# Patient Record
Sex: FEMALE | Race: BLACK OR AFRICAN AMERICAN | Employment: UNEMPLOYED | URBAN - METROPOLITAN AREA
[De-identification: names, ages, dates, MRNs, and addresses within clinical notes are randomized per-mention and may not be internally consistent; named-entity substitution may affect disease eponyms.]

---

## 2019-10-08 ENCOUNTER — HOSPITAL ENCOUNTER (OUTPATIENT)
Dept: NON INVASIVE DIAGNOSTICS | Age: 54
Discharge: HOME | End: 2019-10-08
Attending: FAMILY MEDICINE

## 2019-10-08 DIAGNOSIS — I35.0 AORTIC STENOSIS: ICD-10-CM

## 2019-10-08 PROCEDURE — 93306 TTE W/DOPPLER COMPLETE: CPT

## 2021-03-17 ENCOUNTER — TRANSCRIBE ORDER (OUTPATIENT)
Dept: SCHEDULING | Age: 56
End: 2021-03-17

## 2021-03-17 DIAGNOSIS — I10 HYPERTENSION: Primary | ICD-10-CM

## 2021-03-24 ENCOUNTER — HOSPITAL ENCOUNTER (OUTPATIENT)
Dept: NON INVASIVE DIAGNOSTICS | Age: 56
Discharge: HOME | End: 2021-03-24
Attending: FAMILY MEDICINE
Payer: MEDICAID

## 2021-03-24 DIAGNOSIS — I10 HYPERTENSION: ICD-10-CM

## 2021-03-24 PROCEDURE — 93306 TTE W/DOPPLER COMPLETE: CPT

## 2021-04-05 ENCOUNTER — HOSPITAL ENCOUNTER (OUTPATIENT)
Dept: LAB | Age: 56
Discharge: HOME | End: 2021-04-05
Payer: COMMERCIAL

## 2021-04-05 PROCEDURE — 87624 HPV HI-RISK TYP POOLED RSLT: CPT

## 2021-04-05 PROCEDURE — 88142 CYTOPATH C/V THIN LAYER: CPT

## 2021-04-07 ENCOUNTER — HOSPITAL ENCOUNTER (OUTPATIENT)
Dept: LAB | Age: 56
Discharge: HOME | End: 2021-04-07

## 2021-04-07 PROCEDURE — 88305 TISSUE EXAM BY PATHOLOGIST: CPT

## 2021-04-10 LAB
Lab: NORMAL
REFERENCE LAB,REFLB: NORMAL
TEST DESCRIPTION:,ATST: NORMAL

## 2022-03-15 ENCOUNTER — HOSPITAL ENCOUNTER (OUTPATIENT)
Dept: REHABILITATION | Age: 57
Discharge: HOME | End: 2022-03-15
Payer: MEDICAID

## 2022-03-15 ENCOUNTER — HOSPITAL ENCOUNTER (OUTPATIENT)
Dept: SURGERY | Age: 57
Discharge: HOME | End: 2022-03-15
Payer: MEDICAID

## 2022-03-15 VITALS
HEIGHT: 63 IN | WEIGHT: 202 LBS | OXYGEN SATURATION: 98 % | DIASTOLIC BLOOD PRESSURE: 78 MMHG | SYSTOLIC BLOOD PRESSURE: 130 MMHG | TEMPERATURE: 97.7 F | BODY MASS INDEX: 35.79 KG/M2 | HEART RATE: 73 BPM

## 2022-03-15 LAB
ANION GAP SERPL CALC-SCNC: 4 MMOL/L (ref 7–16)
APTT PPP: 31.1 SEC (ref 24.1–35.1)
ATRIAL RATE: 70 BPM
BACTERIA SPEC CULT: NORMAL
BASOPHILS # BLD: 0 K/UL (ref 0–0.2)
BASOPHILS NFR BLD: 1 % (ref 0–2)
BUN SERPL-MCNC: 15 MG/DL (ref 6–23)
CALCIUM SERPL-MCNC: 9.1 MG/DL (ref 8.3–10.4)
CALCULATED P AXIS, ECG09: 58 DEGREES
CALCULATED R AXIS, ECG10: 2 DEGREES
CALCULATED T AXIS, ECG11: -19 DEGREES
CHLORIDE SERPL-SCNC: 109 MMOL/L (ref 98–107)
CO2 SERPL-SCNC: 29 MMOL/L (ref 21–32)
CREAT SERPL-MCNC: 1.36 MG/DL (ref 0.6–1)
DIAGNOSIS, 93000: NORMAL
DIFFERENTIAL METHOD BLD: ABNORMAL
EOSINOPHIL # BLD: 0.1 K/UL (ref 0–0.8)
EOSINOPHIL NFR BLD: 4 % (ref 0.5–7.8)
ERYTHROCYTE [DISTWIDTH] IN BLOOD BY AUTOMATED COUNT: 13.2 % (ref 11.9–14.6)
EST. AVERAGE GLUCOSE BLD GHB EST-MCNC: NORMAL MG/DL
GLUCOSE SERPL-MCNC: 102 MG/DL (ref 65–100)
HBA1C MFR BLD: 4.9 % (ref 4.2–6.3)
HCT VFR BLD AUTO: 38.6 % (ref 35.8–46.3)
HGB BLD-MCNC: 12.6 G/DL (ref 11.7–15.4)
IMM GRANULOCYTES # BLD AUTO: 0 K/UL (ref 0–0.5)
IMM GRANULOCYTES NFR BLD AUTO: 0 % (ref 0–5)
INR PPP: 0.9
LYMPHOCYTES # BLD: 1.5 K/UL (ref 0.5–4.6)
LYMPHOCYTES NFR BLD: 43 % (ref 13–44)
MCH RBC QN AUTO: 29.2 PG (ref 26.1–32.9)
MCHC RBC AUTO-ENTMCNC: 32.6 G/DL (ref 31.4–35)
MCV RBC AUTO: 89.4 FL (ref 79.6–97.8)
MONOCYTES # BLD: 0.3 K/UL (ref 0.1–1.3)
MONOCYTES NFR BLD: 8 % (ref 4–12)
NEUTS SEG # BLD: 1.5 K/UL (ref 1.7–8.2)
NEUTS SEG NFR BLD: 45 % (ref 43–78)
NRBC # BLD: 0 K/UL (ref 0–0.2)
P-R INTERVAL, ECG05: 204 MS
PLATELET # BLD AUTO: 294 K/UL (ref 150–450)
PMV BLD AUTO: 10.1 FL (ref 9.4–12.3)
POTASSIUM SERPL-SCNC: 3.6 MMOL/L (ref 3.5–5.1)
PROTHROMBIN TIME: 12.7 SEC (ref 12.6–14.5)
Q-T INTERVAL, ECG07: 386 MS
QRS DURATION, ECG06: 96 MS
QTC CALCULATION (BEZET), ECG08: 416 MS
RBC # BLD AUTO: 4.32 M/UL (ref 4.05–5.2)
SERVICE CMNT-IMP: NORMAL
SODIUM SERPL-SCNC: 142 MMOL/L (ref 136–145)
VENTRICULAR RATE, ECG03: 70 BPM
WBC # BLD AUTO: 3.4 K/UL (ref 4.3–11.1)

## 2022-03-15 PROCEDURE — 87641 MR-STAPH DNA AMP PROBE: CPT

## 2022-03-15 PROCEDURE — 80048 BASIC METABOLIC PNL TOTAL CA: CPT

## 2022-03-15 PROCEDURE — 85025 COMPLETE CBC W/AUTO DIFF WBC: CPT

## 2022-03-15 PROCEDURE — 93005 ELECTROCARDIOGRAM TRACING: CPT | Performed by: ANESTHESIOLOGY

## 2022-03-15 PROCEDURE — 77030027138 HC INCENT SPIROMETER -A

## 2022-03-15 PROCEDURE — 85730 THROMBOPLASTIN TIME PARTIAL: CPT

## 2022-03-15 PROCEDURE — 83036 HEMOGLOBIN GLYCOSYLATED A1C: CPT

## 2022-03-15 PROCEDURE — 94760 N-INVAS EAR/PLS OXIMETRY 1: CPT

## 2022-03-15 PROCEDURE — 97161 PT EVAL LOW COMPLEX 20 MIN: CPT

## 2022-03-15 PROCEDURE — 85610 PROTHROMBIN TIME: CPT

## 2022-03-15 RX ORDER — ACETAMINOPHEN 500 MG
1000 TABLET ORAL
COMMUNITY
End: 2022-04-05

## 2022-03-15 RX ORDER — ATORVASTATIN CALCIUM 10 MG/1
10 TABLET, FILM COATED ORAL DAILY
COMMUNITY

## 2022-03-15 RX ORDER — HYDROXYZINE 25 MG/1
25 TABLET, FILM COATED ORAL
COMMUNITY

## 2022-03-15 RX ORDER — HYDROCHLOROTHIAZIDE 25 MG/1
25 TABLET ORAL DAILY
COMMUNITY

## 2022-03-15 RX ORDER — BENAZEPRIL HYDROCHLORIDE 10 MG/1
10 TABLET ORAL DAILY
COMMUNITY

## 2022-03-15 NOTE — PROGRESS NOTES
03/15/22 1030   Oxygen Therapy   O2 Sat (%) 98 %   Pulse via Oximetry 80 beats per minute   O2 Device None (Room air)   Pre-Treatment   Breath Sounds Bilateral Clear   Pre FEV1 (liters) 2.6 liters   % Predicted 100     Initial respiratory Assessment completed with pt. Pt was interviewed and evaluated in Joint camp prior to surgery. Patient ID:  Katja Pain  [id number]  56 y.o.  1965  Surgeon: Dr. Vincent Mckeon  Date of Surgery: 4/5/2022  Procedure: Total Left Hip Arthroplasty  Primary Care Physician: Becky, Rocío, MD None  Specialists:     Pt taught proper COUGH technique  DIAPHRAGMATIC BREATHING EXERCISE INSTRUCTIONS GIVEN    History of smoking:   CURRENT SMOKER-    3-4 CIG   PER DAY  for     15       YEARS  Smoking Cessation  \"SMOKING: YOUR PLAN TO QUIT\" handout given to pt. Pt taught to identify when anxiety or stress  is a trigger . Relaxation breathing technique taught to pt. Quit date:         Secondhand smoke:PARENTS    Past procedures with Oxygen desaturation or delayed awakening:DENIES    Past Medical History:   Diagnosis Date    Anxiety     BMI 35.0-35.9,adult     Current smoker     <0.25 pack/day    High cholesterol     on med for control     History of Bell's palsy     Hypertension     on med for control     OA (osteoarthritis)     CURRENT SLIGHTLY CONGESTED NPC. Respiratory history:DENIES SOB                                                                  Respiratory meds:  DENIES    FAMILY PRESENT:             NO     PAST SLEEP STUDY:                   DENIES  HX OF KESHA:                                           DENIES  KESHA assessment:                                               SLEEPS ON SIDE       &      BACK           PHYSICAL EXAM   Body mass index is 35.78 kg/m².    Visit Vitals  /78 (BP 1 Location: Left upper arm, BP Patient Position: At rest;Sitting)   Pulse 73   Temp 97.7 °F (36.5 °C)   Ht 5' 3\" (1.6 m)   Wt 91.6 kg (202 lb)   SpO2 (P) 98%   BMI 35.78 kg/m²     Neck circumference:  36    cm    Loud snoring:                                                          DENIES  Witnessed apnea or wakening gasping or choking:        DENIES        Awakens with headaches:                                               DENIES  Morning or daytime tiredness/ sleepiness:                      DENIES         Dry mouth or sore throat in morning:                   DENIES                                   Torres stage:  4                                   SACS score:4  Stop Bang   STOP-BANG  Does the patient snore loudly (louder than talking or loud enough to be heard through closed doors)?: No  Does the patient often feel tired, fatigued, or sleepy during the daytime, even after a \"good\" night's sleep?: No  Has anyone ever observed the patient stop breathing during their sleep? : No  Does the patient have or are they being treated for high blood pressure?: Yes  Is the patient's BMI greater than 35?: Yes  Is your neck circumference greater than 17 inches (Male) or 16 inches (Female)?: No  Is the patient older than 48?: Yes  Is the patient male?: No  KESHA Score: 3  Has the patient been referred to Sleep Medicine?: No  Has the patient previously been diagnosed with Obstructive Sleep Apnea?: No                                   CS HS  RESPIRATORY ASSESSMENT Q SHIFT   O2 PRN    ALBUTEROL  NEBULIZER Q6 PRN WHEEZING                                         Referrals:    Pt. Phone Number:

## 2022-03-15 NOTE — PERIOP NOTES
Patient verified name and . Order for consent found in EHR and matches case posting; patient verified. Type 3 surgery, joint camp assessment complete. Labs per surgeon: CBC, BMP, PT/PTT, HGB-A1C ; results pending. Labs per anesthesia protocol: no additional labs needed. EKG: completed today; within anesthesia protocols. Carotid ultrasound (3/31/21) and Echo (3/24/21) available in EHR for reference. MRSA/MSSA swab collected; pharmacy to review and dose antibiotic as appropriate. Hospital approved surgical skin cleanser and instructions to return bottle on DOS given per hospital policy. Patient provided with handouts including Guide to Surgery, Pain Management, Hand Hygiene, Blood Transfusion Education, and [facility] Brochure. Patient answered medical/surgical history questions at their best of ability. All prior to admission medications documented in [facility]. Original medication prescription bottles NOT visualized during patient appointment. Patient instructed to hold all vitamins 3 weeks prior to surgery and NSAIDS 5 days prior to surgery. Patient teach back successful and patient demonstrates knowledge of instruction.

## 2022-03-15 NOTE — PERIOP NOTES
Dr. Rolf Villanueva,     Your patient recently had labs drawn during a hospital appointment due to an upcoming surgery. The results are attached. If you have any questions or concerns please reach out to your patient for a follow-up in your office. Please do not respond to this message as my mailbox is not monitored. You may call [phone] with questions or concerns. Recent Results (from the past 12 hour(s))   EKG, 12 LEAD, INITIAL    Collection Time: 03/15/22 10:00 AM   Result Value Ref Range    Ventricular Rate 70 BPM    Atrial Rate 70 BPM    P-R Interval 204 ms    QRS Duration 96 ms    Q-T Interval 386 ms    QTC Calculation (Bezet) 416 ms    Calculated P Axis 58 degrees    Calculated R Axis 2 degrees    Calculated T Axis -19 degrees    Diagnosis       Normal sinus rhythm  Incomplete right bundle branch block  Nonspecific T wave abnormality  Abnormal ECG  No previous ECGs available     CBC WITH AUTOMATED DIFF    Collection Time: 03/15/22 10:51 AM   Result Value Ref Range    WBC 3.4 (L) 4.3 - 11.1 K/uL    RBC 4.32 4.05 - 5.2 M/uL    HGB 12.6 11.7 - 15.4 g/dL    HCT 38.6 35.8 - 46.3 %    MCV 89.4 79.6 - 97.8 FL    MCH 29.2 26.1 - 32.9 PG    MCHC 32.6 31.4 - 35.0 g/dL    RDW 13.2 11.9 - 14.6 %    PLATELET 048 623 - 078 K/uL    MPV 10.1 9.4 - 12.3 FL    ABSOLUTE NRBC 0.00 0.0 - 0.2 K/uL    DF AUTOMATED      NEUTROPHILS 45 43 - 78 %    LYMPHOCYTES 43 13 - 44 %    MONOCYTES 8 4.0 - 12.0 %    EOSINOPHILS 4 0.5 - 7.8 %    BASOPHILS 1 0.0 - 2.0 %    IMMATURE GRANULOCYTES 0 0.0 - 5.0 %    ABS. NEUTROPHILS 1.5 (L) 1.7 - 8.2 K/UL    ABS. LYMPHOCYTES 1.5 0.5 - 4.6 K/UL    ABS. MONOCYTES 0.3 0.1 - 1.3 K/UL    ABS. EOSINOPHILS 0.1 0.0 - 0.8 K/UL    ABS. BASOPHILS 0.0 0.0 - 0.2 K/UL    ABS. IMM.  GRANS. 0.0 0.0 - 0.5 K/UL   HEMOGLOBIN A1C WITH EAG    Collection Time: 03/15/22 10:51 AM   Result Value Ref Range    Hemoglobin A1c 4.9 4.20 - 6.30 %    Est. average glucose Cannot be calculated mg/dL   METABOLIC PANEL, BASIC Collection Time: 03/15/22 10:51 AM   Result Value Ref Range    Sodium 142 136 - 145 mmol/L    Potassium 3.6 3.5 - 5.1 mmol/L    Chloride 109 (H) 98 - 107 mmol/L    CO2 29 21 - 32 mmol/L    Anion gap 4 (L) 7 - 16 mmol/L    Glucose 102 (H) 65 - 100 mg/dL    BUN 15 6 - 23 MG/DL    Creatinine 1.36 (H) 0.6 - 1.0 MG/DL    GFR est AA 52 (L) >60 ml/min/1.73m2    GFR est non-AA 43 (L) >60 ml/min/1.73m2    Calcium 9.1 8.3 - 10.4 MG/DL   PROTHROMBIN TIME + INR    Collection Time: 03/15/22 10:51 AM   Result Value Ref Range    Prothrombin time 12.7 12.6 - 14.5 sec    INR 0.9     PTT    Collection Time: 03/15/22 10:51 AM   Result Value Ref Range    aPTT 31.1 24.1 - 35.1 SEC

## 2022-03-15 NOTE — PERIOP NOTES
PLEASE CONTINUE TAKING ALL PRESCRIPTION MEDICATIONS UP TO THE DAY OF SURGERY UNLESS OTHERWISE DIRECTED BELOW. DISCONTINUE all vitamins, herbals, and supplements 21 days prior to surgery. DISCONTINUE Non-Steriodal Anti-Inflammatory (NSAIDS) such as Advil, Ibuprofen, Motrin, Aspirin, Naproxen, and Aleve 5-7 days prior to surgery. Home Medications to take  the day of surgery (4/5/22)      Atorvastatin, Hydroxyzine if needed, Tylenol if needed             Home Medications   to Hold           Comments   Bring: Dasha-hex soap, Incentive Spirometer, Photo ID, Insurance card, Benazepril    On the day before surgery (4/4/22)  please take Acetaminophen 1000mg in the morning and then again before bed. You may substitute for Tylenol 650 mg. Please do not bring home medications with you on the day of surgery unless otherwise directed by your nurse. If you are instructed to bring home medications, please give them to your nurse as they will be administered by the nursing staff. If you have any questions, please call [facility] [phone]. A copy of this note was provided to the patient for reference.

## 2022-03-15 NOTE — PROGRESS NOTES
Mitzi Hickman  : (89 y.o.) [address]  at [address] [place] [address], [address] [facility]  [phone]       Physical Therapy Prehab Plan of Treatment and Evaluation Summary:3/15/2022    ICD-10: Treatment Diagnosis:   · Pain in left hip (M25.552)  · Stiffness of Left Hip, Not elsewhere classified (I49.110)  Precautions/Allergies:   Patient has no known allergies. MEDICAL/REFERRING DIAGNOSIS:  Unilateral primary osteoarthritis, left hip [M16.12]  REFERRING PHYSICIAN: Rima Elena MD  DATE OF SURGERY: 22    Assessment:   Comments:  She is here with her friend and is having a L MALIK. She will go home at AK with her friend's support. She has RW for [place]. PROBLEM LIST (Impacting functional limitations):  Ms. Renteria Nicely presents with the following left lower extremity(s) problems:  1. Strength  2. Range of Motion  3. Home Exercise Program  4. Pain   INTERVENTIONS PLANNED:  1. Home Exercise Program  2. Educational Discussion      TREATMENT PLAN: Effective Dates: 3/15/2022 TO 3/15/2022. Frequency/Duration: Patient to continue to perform home exercise program at least twice per day up until her surgery. GOALS: (Goals have been discussed and agreed upon with patient.)  Discharge Goals: Time Frame: 1 Day  1. Patient will demonstrate independence with a home exercise program designed to increase strength, range of motion and pain control to minimize functional deficits and optimize patient for total joint replacement. Rehabilitation Potential For Stated Goals: Good  Regarding Astrid Curtis's therapy, I certify that the treatment plan above will be carried out by a therapist or under their direction.   Thank you for this referral,  Suyapa Alba, PT               HISTORY:   Present Symptoms:  Pain Intensity 1: 10 (at its worst)  Pain Location 1: Hip  Pain Orientation 1: Anterior,Left,Lateral   History of Present Injury/Illness (Reason for Referral):  Medical/Referring Diagnosis: Unilateral primary osteoarthritis, left hip [M16.12]   Past Medical History/Comorbidities:   Ms. Ange De Luna  has a past medical history of Anxiety, BMI 35.0-35.9,adult, Current smoker, High cholesterol, History of Bell's palsy, Hypertension, and OA (osteoarthritis). She has no past medical history of COPD, Endocrine disease, Gastrointestinal disorder, Infectious disease, Neurological disorder, Other ill-defined conditions(799.89), or Sleep disorder. Ms. Ange De Luna  has a past surgical history that includes hx tonsillectomy; hx appendectomy; and hx colonoscopy.   Social History/Living Environment:   Home Environment: Private residence  # Steps to Enter: 3  Rails to Enter: No  One/Two Story Residence: One story  Living Alone: Yes  Support Systems: Friend/Neighbor  Patient Expects to be Discharged to[de-identified] Home with home health  Current DME Used/Available at Home: Cane, straight; Walker, rolling  Tub or Shower Type: Shower    Work/Activity:  disabled  Dominant Side:  LEFT  Current Medications:  See Pre-assessment nursing note   Number of Personal Factors/Comorbidities that affect the Plan of Care: 1-2: MODERATE COMPLEXITY   EXAMINATION:   ADLs (Current Functional Status):   Ambulation:  [x] Independent  [] Walk Indoors Only  [] Walk Outdoors  [x] Use Assistive Device  [] Use Wheelchair Only Dressing:  [x] [address] from Someone for:  [] Sock/Shoes  [] Pants  [] Everything   Bathing/Showering:   [x] Independent  [] Requires Assistance from Someone  [] [address]:  [] Routine house and yard work  [x] Light Housework Only  [] None   Observation/Orthostatic Postural Assessment:       ROM/Flexibility:   AROM: Within functional limits (R LE)                LLE AROM  L Hip Flexion: 80  L Hip ABduction: 15          Strength:   Strength: Generally decreased, functional (R LE 4/5)       LLE Strength  L Hip Flexion: 2  L Hip ABduction: 2  L Knee Extension: 2+  L Ankle Dorsiflexion: 3          Functional Mobility:    Sensation: Intact (R LE)    Stand to Sit: Independent  Sit to Stand: Independent  Stand Pivot Transfers: Independent  Distance (ft): 300 Feet (ft)  Ambulation - Level of Assistance: Modified independent  Assistive Device: Cane, straight  Speed/Elsa: Pace decreased (<100 feet/min)  Step Length: Right shortened  Stance: Left decreased  Gait Abnormalities: Antalgic          Balance:    Sitting: Intact  Standing: With support   Body Structures Involved:  1. Bones  2. Joints  3. Muscles Body Functions Affected:  1. Movement Related Activities and Participation Affected:  1. Mobility   Number of elements that affect the Plan of Care: 4+: HIGH COMPLEXITY   CLINICAL PRESENTATION:   Presentation: Stable and uncomplicated: LOW COMPLEXITY   CLINICAL DECISION MAKING:   Tool Used: Hip dysfunction and Osteoarthritis Outcome Score for Joint Replacement (HOOS, JR)  Score:  Initial: 12 (Interval: 52.965) 3/15/2022 Most Recent:    Interpretation of Score: The HOOS, JR contains 6 items from the original HOOS survey. Items are coded from 0 to 4, none to extreme respectively. Durel Escort is scored by summing the raw response (range 0-24) and then converting it to an interval score using the table provided below. The interval score ranges from 0 to 100 where 0 represents total hip disability and 100 represents perfect hip health. Medical Necessity:   · Ms. Ellyn Murguia is expected to optimize her lower extremity strength and ROM in preparation for joint replacement surgery. Reason for Services/Other Comments:  · Achieve baseline assesment of musculoskeletal system, functional mobility and home environment. , educate in PT HEP in preparation for surgery, educate in hospital plan of care.    Use of outcome tool(s) and clinical judgement create a POC that gives a: Clear prediction of patient's progress: LOW COMPLEXITY   TREATMENT:   Treatment/Session Assessment: Patient was instructed in PT- HEP to increase strength and ROM in LEs. Answered all questions. · Post session pain:  3  · Compliance with Program/Exercises: compliant most of the time.   Total Treatment Duration:  PT Patient Time In/Time Out  Time In: 1100  Time Out: [address], PT

## 2022-03-15 NOTE — PERIOP NOTES
CBC, BMP, PT/PTT, HGB-A1C; results within anesthesia protocols. Staff message sent to [facility] at surgeons office informing them of low wbc count of 3.4 per anesthesia protocols.

## 2022-03-16 NOTE — ADVANCED PRACTICE NURSE
Total Joint Surgery Preoperative Chart Review      Patient ID:  Nia Alvarez  [id number]  56 y.o.  1965  Surgeon: Dr. Divya Boland  Date of Surgery: 4/5/2022  Procedure: Total Left Hip Arthroplasty  Primary Care Physician: Becky, MD Rocío None  Specialty Physician(s):      Subjective:   Nia Alvarez is a 56 y.o. BLACK/ female who presents for preoperative evaluation for Total Left Hip arthroplasty. This is a preoperative chart review note based on data collected by the nurse at the surgical Pre-Assessment visit. Past Medical History:   Diagnosis Date    Anxiety     BMI 35.0-35.9,adult     Current smoker     <0.25 pack/day    High cholesterol     on med for control     History of Bell's palsy     Hypertension     on med for control     OA (osteoarthritis)       Past Surgical History:   Procedure Laterality Date    HX APPENDECTOMY      HX COLONOSCOPY      HX TONSILLECTOMY       History reviewed. No pertinent family history. Social History     Tobacco Use    Smoking status: Current Every Day Smoker     Packs/day: 0.25     Years: 15.00     Pack years: 3.75    Smokeless tobacco: Never Used   Substance Use Topics    Alcohol use: Not Currently       Prior to Admission medications    Medication Sig Start Date End Date Taking? Authorizing Provider   atorvastatin (LIPITOR) 10 mg tablet Take 10 mg by mouth daily. Yes Provider, Historical   hydrOXYzine HCL (ATARAX) 25 mg tablet Take 25 mg by mouth every six (6) hours as needed for Anxiety. Yes Provider, Historical   acetaminophen (Tylenol Extra Strength) 500 mg tablet Take 1,000 mg by mouth every six (6) hours as needed for Pain. Yes Provider, Historical   benazepriL (LOTENSIN) 10 mg tablet Take 10 mg by mouth daily. Indications: high blood pressure   Yes Provider, Historical   hydroCHLOROthiazide (HYDRODIURIL) 25 mg tablet Take 25 mg by mouth daily.  Indications: high blood pressure   Yes Provider, Historical     No Known Allergies       Objective:     Physical Exam:   No data found. ECG:    EKG Results     Procedure 720 Value Units Date/Time    EKG, 12 LEAD, INITIAL [[id number]] Collected: 03/15/22 1000    Order Status: Completed Updated: 03/15/22 1422     Ventricular Rate 70 BPM      Atrial Rate 70 BPM      P-R Interval 204 ms      QRS Duration 96 ms      Q-T Interval 386 ms      QTC Calculation (Bezet) 416 ms      Calculated P Axis 58 degrees      Calculated R Axis 2 degrees      Calculated T Axis -19 degrees      Diagnosis --     Normal sinus rhythm  Incomplete right bundle branch block  Nonspecific T wave abnormality  Abnormal ECG  No previous ECGs available  Confirmed by Kevin Hicks ([id number]) on 3/15/2022 2:22:30 PM            Data Review:   Labs:   Labs reviewed    Problem List:  )There is no problem list on file for this patient. Total Joint Surgery Pre-Assessment Recommendations:           Smoker  Albuterol every 6 hours as need during hospitalization. Continuous saturation monitoring during hospitalization. O2 prn per respiratory protocol.      Signed By: Frankey Lei, NP-C    March 16, 2022

## 2022-04-01 NOTE — H&P
H&P    Patient ID:  Keith Rogers  [id number]  85 y.o.  1965  Surgeon:  Jhony Murray MD  Date of Surgery: * No surgery date entered *  Procedure: Left Total Hip Arthroplasty  Primary Care Physician: Rocío Pena MD        Subjective:  Keith Rogers is a 56 y.o. BLACK/ female who presents with left hip pain. They have a history of left hip pain for several months. Symptoms worse with walking long distances and relieved with rest. Conservative treatment consisting of  activity modification has not helped. The patient lives with their family. The patients goal after surgery is improved pain and function. Past Medical History:   Diagnosis Date    Anxiety     BMI 35.0-35.9,adult     Current smoker     <0.25 pack/day    High cholesterol     on med for control     History of Bell's palsy     Hypertension     on med for control     OA (osteoarthritis)       Past Surgical History:   Procedure Laterality Date    HX APPENDECTOMY      HX COLONOSCOPY      HX TONSILLECTOMY       No family history on file. Social History     Tobacco Use    Smoking status: Current Every Day Smoker     Packs/day: 0.25     Years: 15.00     Pack years: 3.75    Smokeless tobacco: Never Used   Substance Use Topics    Alcohol use: Not Currently       Prior to Admission medications    Medication Sig Start Date End Date Taking? Authorizing Provider   atorvastatin (LIPITOR) 10 mg tablet Take 10 mg by mouth daily. Provider, Historical   hydrOXYzine HCL (ATARAX) 25 mg tablet Take 25 mg by mouth every six (6) hours as needed for Anxiety. Provider, Historical   acetaminophen (Tylenol Extra Strength) 500 mg tablet Take 1,000 mg by mouth every six (6) hours as needed for Pain. Provider, Historical   benazepriL (LOTENSIN) 10 mg tablet Take 10 mg by mouth daily. Indications: high blood pressure    Provider, Historical   hydroCHLOROthiazide (HYDRODIURIL) 25 mg tablet Take 25 mg by mouth daily. Indications: high blood pressure    Provider, Historical     No Known Allergies     REVIEW OF SYSTEMS:  CONSTITUTIONAL: Denies fever, decreased appetite, weight loss/gain, night sweats or fatigue. HEENT: Denies vision or hearing changes. denies glasses. denies hearing aids. CARDIAC: Denies CP, palpitations, rheumatic fever, murmur, peripheral edema, carotid artery disease or syncopal episodes. RESPIRATORY: Denies dyspnea on exertion, asthma, COPD or orthopnea. GI: Denies GERD, history of GI bleed or melena, PUD, hepatitis or cirrhosis. : Denies dysuria, hematuria. denies BPH symptoms. HEMATOLOGIC: Denies anemia or blood disorders. ENDOCRINE: Denies thyroid disease. MUSCULOSKELETAL: See HPI. NEUROLOGIC: Denies seizure, peripheral neuropathy or memory loss. PSYCH: Denies depression, anxiety or insomnia. SKIN: Denies rash or open sores. Objective:    PHYSICAL EXAM  GENERAL: No data found. EYES: PERRL. EOM intact. MOUTH:Teeth and Gums normal. NECK: Full ROM. Trachea midline. No thyromegaly or JVD. CARDIOVASCULAR: Regular rate and rhythm. No murmur or gallops. No carotid bruits. Peripheral pulses: radial 2 +, PT 2+, DP 2+ bilaterally. LUNGS: CTA bilaterally. No wheezes, rhonchi or rales. GI: positive BS. Abdomen nontender. NEUROLOGIC: Alert and oriented x 3. Bilateral equal strong had grasp and bilateral equal strong plantar flexion and dorsiflexion. GAIT: abnormal MUSCULOSKELETAL: ROM: full with pain. Tenderness: . Crepitus: not present. SKIN: No rash, bruising, swelling, redness or warmth. Labs:  No results found for this or any previous visit (from the past 24 hour(s)). Xray: Left hip: joint space narrowing with advanced degenerative changes noted. Assessment:  Advanced Left Hip Osteoarthritis. Total Left Hip Arthroplasty Indicated. There is no problem list on file for this patient.       Plan:  I have advised the patient of the risks and consequences, including possible complications of performing total joint replacement, as well as not doing this operation. The patient had the opportunity to ask questions and have them answered to their satisfaction.      Signed:  RATNA Cade 4/1/2022

## 2022-04-04 ENCOUNTER — ANESTHESIA EVENT (OUTPATIENT)
Dept: SURGERY | Age: 57
End: 2022-04-04
Payer: MEDICAID

## 2022-04-04 NOTE — PERIOP NOTES
[address] [facility] [facility] Phone Call (performed day before scheduled surgery):    1. Have you have any exposure to anyone who has tested positive for COVID19 in the last 7 days? Patient Response: no      2.    Have you experienced any of the below symptoms in the last 48 hours?      -New onset respiratory symptoms                  -Fever or chills                  -Cough / Congestion / running nose                  -Shortness of breath or difficulty breathing                  -Headache                 -Sore Throat                 -New loss or taste or smell                 -Nausea / Vomiting / Diarrhea           Patient Response: no    Comments: no

## 2022-04-05 ENCOUNTER — HOSPITAL ENCOUNTER (OUTPATIENT)
Age: 57
Discharge: HOME | End: 2022-04-05
Attending: ORTHOPAEDIC SURGERY | Admitting: ORTHOPAEDIC SURGERY
Payer: MEDICAID

## 2022-04-05 ENCOUNTER — HOME HEALTH ADMISSION (OUTPATIENT)
Dept: HOME HEALTH SERVICES | Facility: HOME HEALTH | Age: 57
End: 2022-04-05
Payer: MEDICAID

## 2022-04-05 ENCOUNTER — APPOINTMENT (OUTPATIENT)
Dept: GENERAL RADIOLOGY | Age: 57
End: 2022-04-05
Attending: PHYSICIAN ASSISTANT
Payer: MEDICAID

## 2022-04-05 ENCOUNTER — ANESTHESIA (OUTPATIENT)
Dept: SURGERY | Age: 57
End: 2022-04-05
Payer: MEDICAID

## 2022-04-05 VITALS
RESPIRATION RATE: 20 BRPM | HEART RATE: 61 BPM | DIASTOLIC BLOOD PRESSURE: 79 MMHG | TEMPERATURE: 97.7 F | SYSTOLIC BLOOD PRESSURE: 178 MMHG | BODY MASS INDEX: 36.63 KG/M2 | OXYGEN SATURATION: 100 % | WEIGHT: 206.8 LBS

## 2022-04-05 DIAGNOSIS — Z96.642 HISTORY OF TOTAL HIP ARTHROPLASTY, LEFT: Primary | ICD-10-CM

## 2022-04-05 PROBLEM — M16.12 PRIMARY OSTEOARTHRITIS OF LEFT HIP: Status: ACTIVE | Noted: 2022-04-05

## 2022-04-05 LAB — HCG UR QL: NEGATIVE

## 2022-04-05 PROCEDURE — 97530 THERAPEUTIC ACTIVITIES: CPT

## 2022-04-05 PROCEDURE — 74011250636 HC RX REV CODE- 250/636: Performed by: ORTHOPAEDIC SURGERY

## 2022-04-05 PROCEDURE — 74011250636 HC RX REV CODE- 250/636: Performed by: PHYSICIAN ASSISTANT

## 2022-04-05 PROCEDURE — 76060000034 HC ANESTHESIA 1.5 TO 2 HR: Performed by: ORTHOPAEDIC SURGERY

## 2022-04-05 PROCEDURE — 77030007880 HC KT SPN EPDRL BBMI -B: Performed by: ANESTHESIOLOGY

## 2022-04-05 PROCEDURE — 76010000162 HC OR TIME 1.5 TO 2 HR INTENSV-TIER 1: Performed by: ORTHOPAEDIC SURGERY

## 2022-04-05 PROCEDURE — 77030018673: Performed by: ORTHOPAEDIC SURGERY

## 2022-04-05 PROCEDURE — C1776 JOINT DEVICE (IMPLANTABLE): HCPCS | Performed by: ORTHOPAEDIC SURGERY

## 2022-04-05 PROCEDURE — 74011000250 HC RX REV CODE- 250: Performed by: NURSE ANESTHETIST, CERTIFIED REGISTERED

## 2022-04-05 PROCEDURE — 2709999900 HC NON-CHARGEABLE SUPPLY: Performed by: ORTHOPAEDIC SURGERY

## 2022-04-05 PROCEDURE — 77030035236 HC SUT PDS STRATFX BARB J&J -B: Performed by: ORTHOPAEDIC SURGERY

## 2022-04-05 PROCEDURE — 77030033067 HC SUT PDO STRATFX SPIR J&J -B: Performed by: ORTHOPAEDIC SURGERY

## 2022-04-05 PROCEDURE — 77030002966 HC SUT PDS J&J -A: Performed by: ORTHOPAEDIC SURGERY

## 2022-04-05 PROCEDURE — 77030018547 HC SUT ETHBND1 J&J -B: Performed by: ORTHOPAEDIC SURGERY

## 2022-04-05 PROCEDURE — 27130 TOTAL HIP ARTHROPLASTY: CPT | Performed by: ORTHOPAEDIC SURGERY

## 2022-04-05 PROCEDURE — 74011250636 HC RX REV CODE- 250/636: Performed by: ANESTHESIOLOGY

## 2022-04-05 PROCEDURE — 74011000250 HC RX REV CODE- 250: Performed by: ORTHOPAEDIC SURGERY

## 2022-04-05 PROCEDURE — 74011000258 HC RX REV CODE- 258: Performed by: ORTHOPAEDIC SURGERY

## 2022-04-05 PROCEDURE — 77030012935 HC DRSG AQUACEL BMS -B: Performed by: ORTHOPAEDIC SURGERY

## 2022-04-05 PROCEDURE — 97535 SELF CARE MNGMENT TRAINING: CPT

## 2022-04-05 PROCEDURE — 77030031139 HC SUT VCRL2 J&J -A: Performed by: ORTHOPAEDIC SURGERY

## 2022-04-05 PROCEDURE — 77030018836 HC SOL IRR NACL ICUM -A: Performed by: ORTHOPAEDIC SURGERY

## 2022-04-05 PROCEDURE — 77030037714 HC CLOSR DEV INCIS ZIP STRY -C: Performed by: ORTHOPAEDIC SURGERY

## 2022-04-05 PROCEDURE — 94760 N-INVAS EAR/PLS OXIMETRY 1: CPT

## 2022-04-05 PROCEDURE — 81025 URINE PREGNANCY TEST: CPT

## 2022-04-05 PROCEDURE — 72170 X-RAY EXAM OF PELVIS: CPT

## 2022-04-05 PROCEDURE — 27130 TOTAL HIP ARTHROPLASTY: CPT | Performed by: PHYSICIAN ASSISTANT

## 2022-04-05 PROCEDURE — 2709999900 HC NON-CHARGEABLE SUPPLY

## 2022-04-05 PROCEDURE — 97165 OT EVAL LOW COMPLEX 30 MIN: CPT

## 2022-04-05 PROCEDURE — 97161 PT EVAL LOW COMPLEX 20 MIN: CPT

## 2022-04-05 PROCEDURE — 74011250637 HC RX REV CODE- 250/637: Performed by: PHYSICIAN ASSISTANT

## 2022-04-05 PROCEDURE — 77030040922 HC BLNKT HYPOTHRM STRY -A: Performed by: ANESTHESIOLOGY

## 2022-04-05 PROCEDURE — 77030003665 HC NDL SPN BBMI -A: Performed by: ANESTHESIOLOGY

## 2022-04-05 PROCEDURE — 77030006835 HC BLD SAW SAG STRY -B: Performed by: ORTHOPAEDIC SURGERY

## 2022-04-05 PROCEDURE — 77030003666 HC NDL SPINAL BD -A: Performed by: ORTHOPAEDIC SURGERY

## 2022-04-05 PROCEDURE — 77030037713 HC CLOSR DEV INCIS ZIP STRY -B: Performed by: ORTHOPAEDIC SURGERY

## 2022-04-05 PROCEDURE — 77030037715 HC DRSG ZIP STRY -B: Performed by: ORTHOPAEDIC SURGERY

## 2022-04-05 PROCEDURE — 74011250636 HC RX REV CODE- 250/636: Performed by: NURSE ANESTHETIST, CERTIFIED REGISTERED

## 2022-04-05 PROCEDURE — 76210000063 HC OR PH I REC FIRST 0.5 HR: Performed by: ORTHOPAEDIC SURGERY

## 2022-04-05 DEVICE — CUP ACET DIA56MM HIP GRIPTION PRI CEMENTLESS FIX 100 SER: Type: IMPLANTABLE DEVICE | Site: HIP | Status: FUNCTIONAL

## 2022-04-05 DEVICE — HIP H2 TOT ADV OTHER HD IMPL CAPPED SYNTHES: Type: IMPLANTABLE DEVICE | Status: FUNCTIONAL

## 2022-04-05 DEVICE — LINER ACET OD56MM ID36MM HIP ALTRX PINN: Type: IMPLANTABLE DEVICE | Site: HIP | Status: FUNCTIONAL

## 2022-04-05 DEVICE — STEM FEM SZ 5 HIP STD OFFSET CLLRD CEMENTLESS 12/14 TAPR: Type: IMPLANTABLE DEVICE | Site: HIP | Status: FUNCTIONAL

## 2022-04-05 DEVICE — HEAD FEM DIA36MM +8MM OFFSET 12/14 TAPR HIP CERAMIC BIOLOX: Type: IMPLANTABLE DEVICE | Site: HIP | Status: FUNCTIONAL

## 2022-04-05 DEVICE — ELIMINATOR H APEX FOR 48-60MM PINN HIP SHELL: Type: IMPLANTABLE DEVICE | Site: HIP | Status: FUNCTIONAL

## 2022-04-05 RX ORDER — SODIUM CHLORIDE, SODIUM LACTATE, POTASSIUM CHLORIDE, CALCIUM CHLORIDE 600; 310; 30; 20 MG/100ML; MG/100ML; MG/100ML; MG/100ML
100 INJECTION, SOLUTION INTRAVENOUS CONTINUOUS
Status: DISCONTINUED | OUTPATIENT
Start: 2022-04-05 | End: 2022-04-05 | Stop reason: HOSPADM

## 2022-04-05 RX ORDER — TRANEXAMIC ACID 100 MG/ML
INJECTION, SOLUTION INTRAVENOUS AS NEEDED
Status: DISCONTINUED | OUTPATIENT
Start: 2022-04-05 | End: 2022-04-05 | Stop reason: HOSPADM

## 2022-04-05 RX ORDER — FENTANYL CITRATE 50 UG/ML
100 INJECTION, SOLUTION INTRAMUSCULAR; INTRAVENOUS ONCE
Status: DISCONTINUED | OUTPATIENT
Start: 2022-04-05 | End: 2022-04-05 | Stop reason: HOSPADM

## 2022-04-05 RX ORDER — SODIUM CHLORIDE 0.9 % (FLUSH) 0.9 %
5-40 SYRINGE (ML) INJECTION EVERY 8 HOURS
Status: DISCONTINUED | OUTPATIENT
Start: 2022-04-05 | End: 2022-04-05 | Stop reason: HOSPADM

## 2022-04-05 RX ORDER — HYDROCHLOROTHIAZIDE 25 MG/1
25 TABLET ORAL DAILY
Status: DISCONTINUED | OUTPATIENT
Start: 2022-04-05 | End: 2022-04-05 | Stop reason: HOSPADM

## 2022-04-05 RX ORDER — ROPIVACAINE HYDROCHLORIDE 2 MG/ML
INJECTION, SOLUTION EPIDURAL; INFILTRATION; PERINEURAL AS NEEDED
Status: DISCONTINUED | OUTPATIENT
Start: 2022-04-05 | End: 2022-04-05 | Stop reason: HOSPADM

## 2022-04-05 RX ORDER — AMOXICILLIN 250 MG
2 CAPSULE ORAL DAILY
Status: DISCONTINUED | OUTPATIENT
Start: 2022-04-06 | End: 2022-04-05 | Stop reason: HOSPADM

## 2022-04-05 RX ORDER — ALBUTEROL SULFATE 0.83 MG/ML
2.5 SOLUTION RESPIRATORY (INHALATION)
Status: DISCONTINUED | OUTPATIENT
Start: 2022-04-05 | End: 2022-04-05 | Stop reason: HOSPADM

## 2022-04-05 RX ORDER — METHOCARBAMOL 750 MG/1
750 TABLET, FILM COATED ORAL
Qty: 40 TABLET | Refills: 0 | Status: SHIPPED | OUTPATIENT
Start: 2022-04-05

## 2022-04-05 RX ORDER — DIPHENHYDRAMINE HYDROCHLORIDE 50 MG/ML
12.5 INJECTION, SOLUTION INTRAMUSCULAR; INTRAVENOUS
Status: DISCONTINUED | OUTPATIENT
Start: 2022-04-05 | End: 2022-04-05 | Stop reason: HOSPADM

## 2022-04-05 RX ORDER — OXYCODONE HYDROCHLORIDE 5 MG/1
10 TABLET ORAL
Status: DISCONTINUED | OUTPATIENT
Start: 2022-04-05 | End: 2022-04-05 | Stop reason: HOSPADM

## 2022-04-05 RX ORDER — ATORVASTATIN CALCIUM 10 MG/1
10 TABLET, FILM COATED ORAL DAILY
Status: DISCONTINUED | OUTPATIENT
Start: 2022-04-05 | End: 2022-04-05 | Stop reason: HOSPADM

## 2022-04-05 RX ORDER — METHOCARBAMOL 750 MG/1
750 TABLET, FILM COATED ORAL
Status: DISCONTINUED | OUTPATIENT
Start: 2022-04-05 | End: 2022-04-05 | Stop reason: HOSPADM

## 2022-04-05 RX ORDER — ACETAMINOPHEN 500 MG
1000 TABLET ORAL EVERY 6 HOURS
Status: DISCONTINUED | OUTPATIENT
Start: 2022-04-05 | End: 2022-04-05 | Stop reason: HOSPADM

## 2022-04-05 RX ORDER — MIDAZOLAM HYDROCHLORIDE 1 MG/ML
2 INJECTION, SOLUTION INTRAMUSCULAR; INTRAVENOUS
Status: DISCONTINUED | OUTPATIENT
Start: 2022-04-05 | End: 2022-04-05 | Stop reason: HOSPADM

## 2022-04-05 RX ORDER — NALOXONE HYDROCHLORIDE 0.4 MG/ML
0.2 INJECTION, SOLUTION INTRAMUSCULAR; INTRAVENOUS; SUBCUTANEOUS AS NEEDED
Status: DISCONTINUED | OUTPATIENT
Start: 2022-04-05 | End: 2022-04-05 | Stop reason: HOSPADM

## 2022-04-05 RX ORDER — ONDANSETRON 4 MG/1
8 TABLET, ORALLY DISINTEGRATING ORAL
Status: DISCONTINUED | OUTPATIENT
Start: 2022-04-05 | End: 2022-04-05 | Stop reason: HOSPADM

## 2022-04-05 RX ORDER — SODIUM CHLORIDE 9 MG/ML
100 INJECTION, SOLUTION INTRAVENOUS CONTINUOUS
Status: DISCONTINUED | OUTPATIENT
Start: 2022-04-05 | End: 2022-04-05 | Stop reason: HOSPADM

## 2022-04-05 RX ORDER — CEFAZOLIN SODIUM/WATER 2 G/20 ML
2 SYRINGE (ML) INTRAVENOUS ONCE
Status: COMPLETED | OUTPATIENT
Start: 2022-04-05 | End: 2022-04-05

## 2022-04-05 RX ORDER — ASPIRIN 81 MG/1
81 TABLET ORAL EVERY 12 HOURS
Status: DISCONTINUED | OUTPATIENT
Start: 2022-04-05 | End: 2022-04-05 | Stop reason: HOSPADM

## 2022-04-05 RX ORDER — FLUMAZENIL 0.1 MG/ML
0.2 INJECTION INTRAVENOUS
Status: DISCONTINUED | OUTPATIENT
Start: 2022-04-05 | End: 2022-04-05 | Stop reason: HOSPADM

## 2022-04-05 RX ORDER — ACETAMINOPHEN 500 MG
1000 TABLET ORAL EVERY 6 HOURS
Qty: 56 TABLET | Refills: 0 | Status: SHIPPED | OUTPATIENT
Start: 2022-04-05 | End: 2022-04-12

## 2022-04-05 RX ORDER — OXYCODONE HYDROCHLORIDE 5 MG/1
5 TABLET ORAL
Status: DISCONTINUED | OUTPATIENT
Start: 2022-04-05 | End: 2022-04-05 | Stop reason: HOSPADM

## 2022-04-05 RX ORDER — ONDANSETRON 2 MG/ML
INJECTION INTRAMUSCULAR; INTRAVENOUS AS NEEDED
Status: DISCONTINUED | OUTPATIENT
Start: 2022-04-05 | End: 2022-04-05 | Stop reason: HOSPADM

## 2022-04-05 RX ORDER — PROMETHAZINE HYDROCHLORIDE 25 MG/1
25 TABLET ORAL
Qty: 40 TABLET | Refills: 0 | Status: SHIPPED | OUTPATIENT
Start: 2022-04-05

## 2022-04-05 RX ORDER — NALOXONE HYDROCHLORIDE 0.4 MG/ML
.2-.4 INJECTION, SOLUTION INTRAMUSCULAR; INTRAVENOUS; SUBCUTANEOUS
Status: DISCONTINUED | OUTPATIENT
Start: 2022-04-05 | End: 2022-04-05 | Stop reason: HOSPADM

## 2022-04-05 RX ORDER — MIDAZOLAM HYDROCHLORIDE 1 MG/ML
2 INJECTION, SOLUTION INTRAMUSCULAR; INTRAVENOUS ONCE
Status: COMPLETED | OUTPATIENT
Start: 2022-04-05 | End: 2022-04-05

## 2022-04-05 RX ORDER — LIDOCAINE HYDROCHLORIDE 10 MG/ML
0.1 INJECTION INFILTRATION; PERINEURAL AS NEEDED
Status: DISCONTINUED | OUTPATIENT
Start: 2022-04-05 | End: 2022-04-05 | Stop reason: HOSPADM

## 2022-04-05 RX ORDER — PROMETHAZINE HYDROCHLORIDE 25 MG/1
25 TABLET ORAL
Status: DISCONTINUED | OUTPATIENT
Start: 2022-04-05 | End: 2022-04-05 | Stop reason: HOSPADM

## 2022-04-05 RX ORDER — DIPHENHYDRAMINE HCL 25 MG
25 CAPSULE ORAL
Status: DISCONTINUED | OUTPATIENT
Start: 2022-04-05 | End: 2022-04-05 | Stop reason: HOSPADM

## 2022-04-05 RX ORDER — DEXAMETHASONE SODIUM PHOSPHATE 100 MG/10ML
10 INJECTION INTRAMUSCULAR; INTRAVENOUS ONCE
Status: DISCONTINUED | OUTPATIENT
Start: 2022-04-06 | End: 2022-04-05 | Stop reason: HOSPADM

## 2022-04-05 RX ORDER — SODIUM CHLORIDE 0.9 % (FLUSH) 0.9 %
5-40 SYRINGE (ML) INJECTION AS NEEDED
Status: DISCONTINUED | OUTPATIENT
Start: 2022-04-05 | End: 2022-04-05 | Stop reason: HOSPADM

## 2022-04-05 RX ORDER — CEFAZOLIN SODIUM/WATER 2 G/20 ML
2 SYRINGE (ML) INTRAVENOUS EVERY 8 HOURS
Status: DISCONTINUED | OUTPATIENT
Start: 2022-04-05 | End: 2022-04-05 | Stop reason: HOSPADM

## 2022-04-05 RX ORDER — HYDROXYZINE 25 MG/1
25 TABLET, FILM COATED ORAL
Status: DISCONTINUED | OUTPATIENT
Start: 2022-04-05 | End: 2022-04-05 | Stop reason: HOSPADM

## 2022-04-05 RX ORDER — ASPIRIN 81 MG/1
81 TABLET ORAL EVERY 12 HOURS
Qty: 60 TABLET | Refills: 0 | Status: SHIPPED | OUTPATIENT
Start: 2022-04-05 | End: 2022-05-05

## 2022-04-05 RX ORDER — PROPOFOL 10 MG/ML
INJECTION, EMULSION INTRAVENOUS AS NEEDED
Status: DISCONTINUED | OUTPATIENT
Start: 2022-04-05 | End: 2022-04-05 | Stop reason: HOSPADM

## 2022-04-05 RX ORDER — KETAMINE HYDROCHLORIDE 50 MG/ML
INJECTION, SOLUTION INTRAMUSCULAR; INTRAVENOUS AS NEEDED
Status: DISCONTINUED | OUTPATIENT
Start: 2022-04-05 | End: 2022-04-05 | Stop reason: HOSPADM

## 2022-04-05 RX ORDER — ACETAMINOPHEN 500 MG
1000 TABLET ORAL ONCE
Status: DISCONTINUED | OUTPATIENT
Start: 2022-04-05 | End: 2022-04-05 | Stop reason: HOSPADM

## 2022-04-05 RX ORDER — LISINOPRIL 5 MG/1
10 TABLET ORAL DAILY
Status: DISCONTINUED | OUTPATIENT
Start: 2022-04-05 | End: 2022-04-05 | Stop reason: HOSPADM

## 2022-04-05 RX ORDER — OXYCODONE HYDROCHLORIDE 5 MG/1
5-10 TABLET ORAL
Qty: 60 TABLET | Refills: 0 | Status: SHIPPED | OUTPATIENT
Start: 2022-04-05 | End: 2022-04-12 | Stop reason: SDUPTHER

## 2022-04-05 RX ORDER — HYDROMORPHONE HYDROCHLORIDE 2 MG/ML
0.5 INJECTION, SOLUTION INTRAMUSCULAR; INTRAVENOUS; SUBCUTANEOUS
Status: DISCONTINUED | OUTPATIENT
Start: 2022-04-05 | End: 2022-04-05 | Stop reason: HOSPADM

## 2022-04-05 RX ORDER — OXYCODONE HYDROCHLORIDE 5 MG/1
5-10 TABLET ORAL
Status: DISCONTINUED | OUTPATIENT
Start: 2022-04-05 | End: 2022-04-05 | Stop reason: HOSPADM

## 2022-04-05 RX ORDER — PROPOFOL 10 MG/ML
INJECTION, EMULSION INTRAVENOUS
Status: DISCONTINUED | OUTPATIENT
Start: 2022-04-05 | End: 2022-04-05 | Stop reason: HOSPADM

## 2022-04-05 RX ORDER — EPHEDRINE SULFATE/0.9% NACL/PF 50 MG/5 ML
SYRINGE (ML) INTRAVENOUS AS NEEDED
Status: DISCONTINUED | OUTPATIENT
Start: 2022-04-05 | End: 2022-04-05

## 2022-04-05 RX ORDER — HYDROMORPHONE HYDROCHLORIDE 1 MG/ML
1 INJECTION, SOLUTION INTRAMUSCULAR; INTRAVENOUS; SUBCUTANEOUS
Status: DISCONTINUED | OUTPATIENT
Start: 2022-04-05 | End: 2022-04-05 | Stop reason: HOSPADM

## 2022-04-05 RX ADMIN — HYDROMORPHONE HYDROCHLORIDE 1 MG: 1 INJECTION, SOLUTION INTRAMUSCULAR; INTRAVENOUS; SUBCUTANEOUS at 10:35

## 2022-04-05 RX ADMIN — METHOCARBAMOL TABLETS 750 MG: 750 TABLET, COATED ORAL at 11:09

## 2022-04-05 RX ADMIN — PHENYLEPHRINE HYDROCHLORIDE 100 MCG: 10 INJECTION INTRAVENOUS at 07:59

## 2022-04-05 RX ADMIN — Medication 2 G: at 07:16

## 2022-04-05 RX ADMIN — ATORVASTATIN CALCIUM 10 MG: 10 TABLET, FILM COATED ORAL at 10:14

## 2022-04-05 RX ADMIN — MIDAZOLAM 2 MG: 1 INJECTION INTRAMUSCULAR; INTRAVENOUS at 06:57

## 2022-04-05 RX ADMIN — HYDROCHLOROTHIAZIDE 25 MG: 25 TABLET ORAL at 10:14

## 2022-04-05 RX ADMIN — MEPIVACAINE HYDROCHLORIDE 60 MG: 20 INJECTION, SOLUTION EPIDURAL; INFILTRATION at 07:25

## 2022-04-05 RX ADMIN — PHENYLEPHRINE HYDROCHLORIDE 100 MCG: 10 INJECTION INTRAVENOUS at 08:06

## 2022-04-05 RX ADMIN — OXYCODONE 10 MG: 5 TABLET ORAL at 10:14

## 2022-04-05 RX ADMIN — Medication 3 AMPULE: at 06:14

## 2022-04-05 RX ADMIN — SODIUM CHLORIDE, SODIUM LACTATE, POTASSIUM CHLORIDE, AND CALCIUM CHLORIDE 100 ML/HR: 600; 310; 30; 20 INJECTION, SOLUTION INTRAVENOUS at 06:18

## 2022-04-05 RX ADMIN — LISINOPRIL 10 MG: 5 TABLET ORAL at 10:13

## 2022-04-05 RX ADMIN — TRANEXAMIC ACID 1 G: 100 INJECTION, SOLUTION INTRAVENOUS at 07:40

## 2022-04-05 RX ADMIN — SODIUM CHLORIDE, SODIUM LACTATE, POTASSIUM CHLORIDE, AND CALCIUM CHLORIDE: 600; 310; 30; 20 INJECTION, SOLUTION INTRAVENOUS at 08:05

## 2022-04-05 RX ADMIN — PROPOFOL 120 MCG/KG/MIN: 10 INJECTION, EMULSION INTRAVENOUS at 07:35

## 2022-04-05 RX ADMIN — ONDANSETRON 4 MG: 2 INJECTION INTRAMUSCULAR; INTRAVENOUS at 08:40

## 2022-04-05 RX ADMIN — PROPOFOL 50 MG: 10 INJECTION, EMULSION INTRAVENOUS at 07:30

## 2022-04-05 RX ADMIN — KETAMINE HYDROCHLORIDE 30 MG: 50 INJECTION, SOLUTION INTRAMUSCULAR; INTRAVENOUS at 07:35

## 2022-04-05 RX ADMIN — PROPOFOL 50 MG: 10 INJECTION, EMULSION INTRAVENOUS at 07:35

## 2022-04-05 NOTE — PROGRESS NOTES
04/05/22 1531   Oxygen Therapy   O2 Sat (%) 100 %   Pulse via Oximetry 70 beats per minute   O2 Device None (Room air)   Incentive Spirometry Treatment   Actual Volume (ml)   (pt left IS at home.   She is being discharged today)

## 2022-04-05 NOTE — INTERVAL H&P NOTE
Update History & Physical    The Patient's History and Physical of April 1, 2022 was reviewed with the patient and I examined the patient. There was no change. The surgical site was confirmed by the patient and me. Plan:  The risk, benefits, expected outcome, and alternative to the recommended procedure have been discussed with the patient. Patient understands and wants to proceed with the procedure.     Electronically signed by Yolie Og MD on 4/5/2022 at 6:47 AM

## 2022-04-05 NOTE — ANESTHESIA PREPROCEDURE EVALUATION
Relevant Problems   No relevant active problems       Anesthetic History   No history of anesthetic complications            Review of Systems / Medical History  Patient summary reviewed and pertinent labs reviewed    Pulmonary          Smoker         Neuro/Psych   Within defined limits           Cardiovascular    Hypertension          Hyperlipidemia    Exercise tolerance: >4 METS  Comments: ECHO '19 - preserved EF, mod AI   GI/Hepatic/Renal  Within defined limits              Endo/Other        Arthritis     Other Findings            Physical Exam    Airway  Mallampati: III  TM Distance: 4 - 6 cm  Neck ROM: normal range of motion   Mouth opening: Normal     Cardiovascular    Rhythm: regular  Rate: normal    Murmur: Grade 3, Aortic area     Dental         Pulmonary  Breath sounds clear to auscultation               Abdominal         Other Findings            Anesthetic Plan    ASA: 2  Anesthesia type: spinal            Anesthetic plan and risks discussed with: Patient and Father

## 2022-04-05 NOTE — INTERVAL H&P NOTE
Update History & Physical    The Patient's History and Physical of April 1, 22 was reviewed with the patient and I examined the patient. There was no change. The surgical site was confirmed by the patient and me. Plan:  The risk, benefits, expected outcome, and alternative to the recommended procedure have been discussed with the patient. Patient understands and wants to proceed with the procedure.     Electronically signed by RATNA Dickerson on 4/5/2022 at 7:04 AM

## 2022-04-05 NOTE — PROGRESS NOTES
Problem: Mobility Impaired (Adult and Pediatric)  Goal: *Acute Goals and Plan of Care (Insert Text)  Outcome: Progressing Towards Goal  Note: GOALS (1-4 days):  (1.)Ms. Ravi Amador will move from supine to sit and sit to supine  in bed with INDEPENDENT. (2.)Ms. Ravi Amador will transfer from bed to chair and chair to bed with SUPERVISION using the least restrictive device. (3.)Ms. Ravi Amador will ambulate with SUPERVISION for 650 feet with the least restrictive device. (4.)Ms. Ravi Amador will ambulate up/down 3 steps without a railing with CONTACT GUARD ASSIST with cane. (5.)Ms. Ravi Amador will state/observe MALIK precautions with 1 verbal cues. ________________________________________________________________________________________________       PHYSICAL THERAPY JOINT CAMP MALIK: Initial Assessment and PM 4/5/2022  OUTPATIENT: Hospital Day: 1  Payor: Gracie Huff / Plan: [place] [employer] / Product Type: Managed Care Medicaid /      NAME/AGE/GENDER: Juan Milton is a 56 y.o. female   PRIMARY DIAGNOSIS:  Osteoarthritis of left hip, unspecified osteoarthritis type [M16.12]   Procedure(s) and Anesthesia Type:     * LEFT HIP ARTHROPLASTY TOTAL DEPUY/**SDD** - Spinal (Left)  ICD-10: Treatment Diagnosis:    Pain in left hip (M25.552)  Stiffness of Left Hip, Not elsewhere classified (M25.652)  Difficulty in walking, Not elsewhere classified (R26.2)      ASSESSMENT:     Ms. Ravi Amador presents s/p L MALIK. Patient demonstrates decreased L LE strength and ROM and decreased independence with mobility. Patient would benefit from therapy to address these deficits prior to d/c with assist from her father. Patient moved well during assessment. Antalgic pattern with walker but no additional support needed for mobility. Needed min A to ambulate up/down the steps with a cane. Patient with good tolerance of her exercises. She is planning to d/c today. Patient educated in HEP and use of walker for gait.  Patient reports no concerns. Will continue therapy if she remains overnight. This section established at most recent assessment   PROBLEM LIST (Impairments causing functional limitations):  Decreased Strength  Decreased ADL/Functional Activities  Decreased Transfer Abilities  Decreased Ambulation Ability/Technique  Increased Pain  Decreased Flexibility/Joint Mobility  Decreased Lares with Home Exercise Program   INTERVENTIONS PLANNED: (Benefits and precautions of physical therapy have been discussed with the patient.)  Bed Mobility  Cold  Gait Training  Home Exercise Program (HEP)  Range of Motion (ROM)  Therapeutic Activites  Therapeutic Exercise/Strengthening  Transfer Training     TREATMENT PLAN: Frequency/Duration: Follow patient BID for duration of hospital stay to address above goals. Rehabilitation Potential For Stated Goals: Good     RECOMMENDED REHABILITATION/EQUIPMENT: (at time of discharge pending progress): Continue Skilled Therapy. HISTORY:   History of Present Injury/Illness (Reason for Referral):  Pt s/p MALIK on 4/5/22  Past Medical History/Comorbidities:   Ms. Ranulfo Nielsen  has a past medical history of Anxiety, BMI 35.0-35.9,adult, Current smoker, High cholesterol, History of Bell's palsy, Hypertension, and OA (osteoarthritis). She has no past medical history of COPD, Endocrine disease, Gastrointestinal disorder, Infectious disease, Neurological disorder, Other ill-defined conditions(799.89), or Sleep disorder. Ms. Ranulfo Nielsen  has a past surgical history that includes hx tonsillectomy; hx appendectomy; and hx colonoscopy.    Social History/Living Environment:   Home Environment: Private residence  # Steps to Enter: 3  Rails to Enter: No  One/Two Story Residence: One story  Living Alone: Yes  Support Systems: Parent(s)  Patient Expects to be Discharged to[de-identified] Home with home health  Current DME Used/Available at Home: Candelaria Gardner, straight; Walker, rolling  Tub or Shower Type: Shower    Prior Level of Function/Work/Activity:  Ambulating with a cane   Number of Personal Factors/Comorbidities that affect the Plan of Care: 0: LOW COMPLEXITY   EXAMINATION:   Most Recent Physical Functioning:   Gross Assessment: Yes  Gross Assessment  AROM: Generally decreased, functional  Strength: Generally decreased, functional  Coordination: Generally decreased, functional  Tone: Normal  Sensation: Intact                     Bed Mobility  Supine to Sit: Stand-by assistance  Sit to Supine: Minimum assistance  Scooting: Stand-by assistance    Transfers  Sit to Stand: Stand-by assistance  Stand to Sit: Stand-by assistance  Bed to Chair: Stand-by assistance    Balance  Sitting: Intact  Standing: With support              Weight Bearing Status  Left Side Weight Bearing: As tolerated  Distance (ft): 300 Feet (ft) (x 2)  Ambulation - Level of Assistance: Stand-by assistance  Assistive Device: Walker, rolling  Base of Support: Center of gravity altered  Step Length: Left shortened;Right shortened  Stance: Left decreased  Gait Abnormalities: Antalgic  Number of Stairs Trained: 3  Stairs - Level of Assistance: Minimum assistance  Rail Use: None (straight cane)  Interventions: Safety awareness training;Verbal cues     Braces/Orthotics: none           Body Structures Involved:  Joints  Muscles Body Functions Affected: Movement Related Activities and Participation Affected: Mobility  Self Care   Number of elements that affect the Plan of Care: 4+: HIGH COMPLEXITY   CLINICAL PRESENTATION:   Presentation: Stable and uncomplicated: LOW COMPLEXITY   CLINICAL DECISION MAKIN[address] [place] [address] AM-PAC 6 Clicks   Basic Mobility Inpatient Short Form  How much difficulty does the patient currently have. .. Unable A Lot A Little None   1. Turning over in bed (including adjusting bedclothes, sheets and blankets)? [] 1   [] 2   [x] 3   [] 4   2.   Sitting down on and standing up from a chair with arms ( e.g., wheelchair, bedside commode, etc.) [] 1   [] 2   [x] 3   [] 4   3. Moving from lying on back to sitting on the side of the bed? [] 1   [] 2   [x] 3   [] 4   How much help from another person does the patient currently need. .. Total A Lot A Little None   4. Moving to and from a bed to a chair (including a wheelchair)? [] 1   [] 2   [x] 3   [] 4   5. Need to walk in hospital room? [] 1   [] 2   [x] 3   [] 4   6. Climbing 3-5 steps with a railing? [] 1   [] 2   [x] 3   [] 4   © 2007, Trustees of [address] [place] [address] Box [facility], under license to [employer]. All rights reserved     Score:  Initial: 18 Most Recent: X (Date: -- )    Interpretation of Tool:  Represents activities that are increasingly more difficult (i.e. Bed mobility, Transfers, Gait). Medical Necessity:     Patient is expected to demonstrate progress in   strength, range of motion, and functional technique   to   decrease assistance required with functional mobility and MALIK management  . Reason for Services/Other Comments:  Patient continues to require skilled intervention due to   Inability to complete functional mobility and MALIK management independently  . Use of outcome tool(s) and clinical judgement create a POC that gives a: Clear prediction of patient's progress: LOW COMPLEXITY            TREATMENT:   (In addition to Assessment/Re-Assessment sessions the following treatments were rendered)     Pre-treatment Symptoms/Complaints:  patient agreeable. Pain Initial:   Pain Intensity 1: 4  Post Session:  4     Therapeutic Activity: (    25 minutes): Therapeutic activities including Bed transfers, Chair transfers, Ambulation on level ground, Stairs, exercises as below, and d/c education to improve mobility, strength, and balance. Required minimal Safety awareness training;Verbal cues to promote static and dynamic balance in standing and promote coordination of left, lower extremity(s).      assessment    Date:  4/5/22 Date:   Date:     ACTIVITY/EXERCISE AM PM AM PM AM PM     []  [] []  []  []  []   Ankle Pumps  20       Quad Sets  20       Gluteal Sets  20       Hip ABd/ADduction  20       Knee Slides  20 AA       Short Arc Quads  20       Long Arc Quads  20                                  B = bilateral; AA = active assistive; A = active; P = passive      Treatment/Session Assessment:     Response to Treatment:  Patient participated well and moved well. Education:  [x] Home Exercises  [x] Fall Precautions  [x] Hip Precautions [x] D/C Instruction Review  [] Hip Prosthesis Review  [x] Walker Management/Safety [] Adaptive Equipment as Needed       Interdisciplinary Collaboration:   Physical Therapist  Registered Nurse    After treatment position/precautions:   Supine in bed  Bed/Chair-wheels locked  Call light within reach  RN notified  Family at bedside    Compliance with Program/Exercises: Will assess as treatment progresses. Recommendations/Intent for next treatment session:  Treatment next visit will focus on increasing Ms. Simss independence with bed mobility, transfers, gait training, strength/ROM exercises, modalities for pain, and patient education.       Total Treatment Duration:  PT Patient Time In/Time Out  Time In: 1230  Time Out: 75 Dmitriy Weiner PT

## 2022-04-05 NOTE — PROGRESS NOTES
Pt discharge summary and home medication sheet reviewed with pt and signed . All goals met. Pt voiding well. Pain to hip controlled. Pt leaving hospital via w/c with staff member and family.

## 2022-04-05 NOTE — DISCHARGE SUMMARY
[address] [place] [address]  Total Joint Discharge Summary      Patient ID:  Ahsan Barry  [id number]  56 y.o.  1965    Admit date: 4/5/2022  Discharge date and time: 4-5-22  Admitting Physician: Rohan Lares MD  Surgeon: Same  Admission Diagnoses: Osteoarthritis of left hip, unspecified osteoarthritis type [M16.12]  Primary osteoarthritis of left hip [M16.12]  Discharge Diagnoses: Active Problems:    Primary osteoarthritis of left hip (4/5/2022)                                Perioperative Antibiotics: Ancef 1 to 3 g was given depending on patient's weight. If allergic to Ancef or due to other indications, patient was given Vancomycin/Gent per protocol      Hospital Medications given:   [unfilled]  [unfilled]  [unfilled]    Discharge Medications given:  Current Discharge Medication List      START taking these medications    Details   aspirin delayed-release 81 mg tablet Take 1 Tablet by mouth every twelve (12) hours every twelve (12) hours for 30 days. Qty: 60 Tablet, Refills: 0      oxyCODONE IR (ROXICODONE) 5 mg immediate release tablet Take 1-2 Tablets by mouth every four (4) hours as needed for Pain for up to 7 days. Max Daily Amount: 60 mg.  Qty: 60 Tablet, Refills: 0    Associated Diagnoses: History of total hip arthroplasty, left      promethazine (PHENERGAN) 25 mg tablet Take 1 Tablet by mouth every six (6) hours as needed for Nausea. Qty: 40 Tablet, Refills: 0      methocarbamoL (ROBAXIN) 750 mg tablet Take 1 Tablet by mouth four (4) times daily as needed for Muscle Spasm(s). Qty: 40 Tablet, Refills: 0         CONTINUE these medications which have CHANGED    Details   acetaminophen (TYLENOL) 500 mg tablet Take 2 Tablets by mouth every six (6) hours for 7 days. Qty: 56 Tablet, Refills: 0         CONTINUE these medications which have NOT CHANGED    Details   atorvastatin (LIPITOR) 10 mg tablet Take 10 mg by mouth daily.       hydrOXYzine HCL (ATARAX) 25 mg tablet Take 25 mg by mouth every six (6) hours as needed for Anxiety. benazepriL (LOTENSIN) 10 mg tablet Take 10 mg by mouth daily. Indications: high blood pressure      hydroCHLOROthiazide (HYDRODIURIL) 25 mg tablet Take 25 mg by mouth daily. Indications: high blood pressure              Additional DVT Prophylaxis:  JUSTO Hose,Plexi-Pulse    Postoperative transfusions:   none  Post Op complications: none    Hemoglobin at discharge:   Lab Results   Component Value Date/Time    HGB 12.6 03/15/2022 10:51 AM       Wound appears to be healing without any evidence of infection. Physical Therapy started on the day following surgery and progressed to independent ambulation with the aid of a walker. At the time of discharge, able to go up and down stairs and had understanding of precautions needed following surgery.       PT/OT:                             Discharged to: home    Discharge instructions:  -Rx pain medication given  - Anticoagulate with: Ecotrin 81 mg PO BID x 4 weeks  -Resume pre hospital diet             -Resume home medications per medical continuation form     -Ambulate with walker, appropriate total joint protocol  -Follow up in office as scheduled       Signed:  RATNA Miller  4/5/2022  12:33 PM

## 2022-04-05 NOTE — PROGRESS NOTES
Problem: Self Care Deficits Care Plan (Adult)  Goal: *Acute Goals and Plan of Care (Insert Text)  Outcome: Progressing Towards Goal  Note: GOALS:   DISCHARGE GOALS (in preparation for going home/rehab):  3 days  1. Ms. Leona Lucas will perform one lower body dressing activity with minimal assistance with adaptive equipment to demonstrate improved functional mobility and safety. 2.  Ms. Leona Lucas will perform one lower body bathing activity with minimal  assistance with adaptive equipment to demonstrate improved functional mobility and safety. 3.  Ms. Leona Lucas will perform toileting/toilet transfer with contact guard assistance with adaptive equipment to demonstrate improved functional mobility and safety. 4.  Ms. Leona Lucas will perform shower transfer with contact guard assistance with adaptive equipment to demonstrate improved functional mobility and safety. 5.  Ms. Leona Lucas will state MALIK precautions with two verbal cues to demonstrate improved functional mobility and safety. JOINT CAMP OCCUPATIONAL THERAPY MALIK: Initial Assessment, Daily Note, and AM 4/5/2022  OUTPATIENT: Hospital Day: 1  Payor: Halle Shah / Plan[place] [employer] / Product Type: Managed Care Medicaid /      NAME/AGE/GENDER: Jose Cruz Payton is a 56 y.o. female   PRIMARY DIAGNOSIS:  Osteoarthritis of left hip, unspecified osteoarthritis type [M16.12]   Procedure(s) and Anesthesia Type:     * LEFT HIP ARTHROPLASTY TOTAL DEPUY/**SDD** - Spinal (Left)  ICD-10: Treatment Diagnosis:    · Pain in left hip (M25.552)  · Stiffness of Left Hip, Not elsewhere classified (B44.449)      ASSESSMENT:     Ms. Houghton Beverage is s/p L MALIK and presents with decreased weight bearing on L LE and decreased independence with functional mobility and activities of daily living as compared to prior level of function and safety.   Patient would benefit from skilled Occupational Therapy to maximize independence and safety with self-care task and functional mobility. Pt would also benefit from education on lower body adaptive equipment and hip precautions post-surgery in preparation for going home. Patient plans for further rehab at home with home health services and good family support. OT reviewed therapy schedule and plan of care with patient. Patient was able to transfer and perform self care skills as charted below. Patient instructed to call for assistance when needing to get up from the recliner and all needs in reach. Patient verbalized understanding of call light. Pt donned gown and robe at edge of bed. Pt CGA for functional mobility in room. Pt left sitting in recliner with needs in reach. Pt will do well in am with ADL. This section established at most recent assessment   PROBLEM LIST (Impairments causing functional limitations):  1. Decreased Strength  2. Decreased ADL/Functional Activities  3. Decreased Transfer Abilities  4. Increased Pain  5. Increased Fatigue  6. Decreased Flexibility/Joint Mobility  7. Decreased Knowledge of Precautions   INTERVENTIONS PLANNED: (Benefits and precautions of occupational therapy have been discussed with the patient.)  1. Activities of daily living training  2. Adaptive equipment training  3. Balance training  4. Clothing management  5. Donning&doffing training  6. Theraputic activity     TREATMENT PLAN: Frequency/Duration: Follow patient qd to address above goals. Rehabilitation Potential For Stated Goals: Good     RECOMMENDED REHABILITATION/EQUIPMENT: (at time of discharge pending progress): Continue Skilled Therapy and Home Health: Physical Therapy. OCCUPATIONAL PROFILE AND HISTORY:   History of Present Injury/Illness (Reason for Referral): Pt presents this date s/p (L) MALIK. Past Medical History/Comorbidities:   Ms. Pop Kirkpatrick  has a past medical history of Anxiety, BMI 35.0-35.9,adult, Current smoker, High cholesterol, History of Bell's palsy, Hypertension, and OA (osteoarthritis). She has no past medical history of COPD, Endocrine disease, Gastrointestinal disorder, Infectious disease, Neurological disorder, Other ill-defined conditions(799.89), or Sleep disorder. Ms. Ravi Amador  has a past surgical history that includes hx tonsillectomy; hx appendectomy; and hx colonoscopy. Social History/Living Environment:   Home Environment: Private residence  # Steps to Enter: 3  Rails to Enter: No  One/Two Story Residence: One story  Living Alone: No  Support Systems: Spouse/Significant Other  Patient Expects to be Discharged to[de-identified] Home with home health  Current DME Used/Available at Home: None  Tub or Shower Type: Shower    Prior Level of Function/Work/Activity:  independent     Number of Personal Factors/Comorbidities that affect the Plan of Care: Brief history (0):  LOW COMPLEXITY   ASSESSMENT OF OCCUPATIONAL PERFORMANCE[de-identified]   Most Recent Physical Functioning:   Balance  Sitting: Intact  Standing: With support       Gross Assessment: Yes  Gross Assessment  AROM: Within functional limits (BUE)  Strength:  Within functional limits (BUE)  Coordination: Within functional limits (BUE)  Tone: Normal  Sensation: Intact                 Vision  Acuity: Within Defined Limits    Mental Status  Neurologic State: Alert  Orientation Level: Oriented X4  Cognition: Follows commands  Perception: Appears intact  Perseveration: No perseveration noted  Safety/Judgement: Fall prevention                Basic ADLs (From Assessment) Complex ADLs (From Assessment)   Basic ADL  Feeding: Setup  Oral Facial Hygiene/Grooming: Setup  Bathing: Minimum assistance  Upper Body Dressing: Setup  Lower Body Dressing: Minimum assistance  Toileting: Setup     Grooming/Bathing/Dressing Activities of Daily Living     Cognitive Retraining  Safety/Judgement: Fall prevention                 Functional Transfers  Bathroom Mobility: Contact guard assistance  Toilet Transfer : Contact guard assistance  Shower Transfer: Contact guard assistance     Bed/Mat Mobility  Supine to Sit: Stand-by assistance  Sit to Stand: Contact guard assistance  Stand to Sit: Contact guard assistance  Bed to Chair: Contact guard assistance  Scooting: Stand-by assistance         Physical Skills Involved:  1. Balance  2. Strength  3. Activity Tolerance Cognitive Skills Affected (resulting in the inability to perform in a timely and safe manner): 1. none Psychosocial Skills Affected:  1. none   Number of elements that affect the Plan of Care: 1-3:  LOW COMPLEXITY   CLINICAL DECISION MAKING:   [facility] AM-PAC 6 Clicks   Daily Activity Inpatient Short Form  How much help from another person does the patient currently need. .. Total A Lot A Little None   1. Putting on and taking off regular lower body clothing? [] 1   [] 2   [x] 3   [] 4   2. Bathing (including washing, rinsing, drying)? [] 1   [] 2   [x] 3   [] 4   3. Toileting, which includes using toilet, bedpan or urinal?   [] 1   [] 2   [] 3   [x] 4   4. Putting on and taking off regular upper body clothing? [] 1   [] 2   [] 3   [x] 4   5. Taking care of personal grooming such as brushing teeth? [] 1   [] 2   [] 3   [x] 4   6. Eating meals? [] 1   [] 2   [] 3   [x] 4   © 2007, Trustees of [facility], under license to [employer]. All rights reserved     Score:  Initial: 22 Most Recent: X (Date: -- )    Interpretation of Tool:  Represents activities that are increasingly more difficult (i.e. Bed mobility, Transfers, Gait). Medical Necessity:     · Patient is expected to demonstrate progress in   · strength, balance, coordination, and functional technique  ·  to   · increase independence with self care and functional mobiltiy   · . Reason for Services/Other Comments:  · Patient continues to require skilled intervention due to   · Decrease self care and functional mobility  · .    Use of outcome tool(s) and clinical judgement create a POC that gives a: LOW COMPLEXITY            TREATMENT:   (In addition to Assessment/Re-Assessment sessions the following treatments were rendered)     Pre-treatment Symptoms/Complaints:  tolerated sitting up in recliner  Pain: Initial:   Pain Intensity 1: 0  Post Session:  0     Self Care: (15): Procedure(s) (per grid) utilized to improve and/or restore self-care/home management as related to dressing and functional mobility  . Required minimal verbal and   cueing to facilitate activities of daily living skills and compensatory activities. Assessment    Treatment/Session Assessment:     Response to Treatment:  tolerated well. Education:  [] Home Exercises  [x] Fall Precautions  [x] Hip Precautions [] Going Home Video  [] Knee/Hip Prosthesis Review  [x] Walker Management/Safety [x] Adaptive Equipment as Needed       Interdisciplinary Collaboration:   o Physical Therapist  o Occupational Therapist  o Registered Nurse    After treatment position/precautions:   o Up in chair  o Bed/Chair-wheels locked  o Caregiver at bedside  o Call light within reach  o RN notified  o LEs reclined      Compliance with Program/Exercises: Compliant all of the time. Recommendations/Intent for next treatment session:  Treatment next visit will focus on increasing Ms. Simss independence with bed mobility, transfers, self care, functional mobility, modalities for pain, and patient education.       Total Treatment Duration:  OT Patient Time In/Time Out  Time In: 1115  Time Out: 995 [facility]

## 2022-04-05 NOTE — PROGRESS NOTES
TRANSFER - IN REPORT:    Verbal report received from ronn Genao(name) on Anton Estrada  being received from PACU(unit) for routine post - op      Report consisted of patients Situation, Background, Assessment and   Recommendations(SBAR). Information from the following report(s) SBAR, Kardex, OR Summary, Procedure Summary, Intake/Output, MAR and Recent Results was reviewed with the receiving nurse. Opportunity for questions and clarification was provided. Assessment completed upon patients arrival to unit and care assumed.

## 2022-04-05 NOTE — OP NOTES
[facility]  Total Hip Procedure Note  Patient:Astrid Donato Mc   : 1965  Medical Record [id number]      Pre-operative Diagnosis:  Osteoarthritis of left hip, unspecified osteoarthritis type [M16.12]  Post-operative Diagnosis: Osteoarthritis of left hip, unspecified osteoarthritis type [M16.12]    Surgeon: Gabby Brannon MD  Assistant:Arian Napier PA-C    Anesthesia: Spinal      Procedure: Total Hip Arthroplasty   The complexity of the total joint surgery requires the use of a first assistant for positioning, retraction and assistance in closure. The patient's Body mass index is 36.63 kg/m²., BMI's greater then 40 make surgical exposure and retraction extremely difficult and increase operative time. Isai Lanier was brought to the operating room and positioned on the operating table. She was anesthestized . IV antibiotics per CMS protocol were administered. Prior to the incision being made a timeout was called identifying the patient, procedure ,operative side and surgeon. Isai Lanier was positioned in the lateral decubitus position with the  left  side up. The limb was prepped and draped in the usual sterile fashion. The direct lateral approach was utilized to expose the hip. The incision was carried through the subcutaneous tissue and underlying fascia with homeostasis obtained using the bovie cauterization. A Charnley retractor was inserted. The abductors were taken down at the junction of the anterior and middle third. The sciatic nerve was palpated and identified. Following comparison of leg lengths, the femoral head was dislocated. The neck was osteotomized in the appropriate position just above the lesser trochanteric region. The acetabular retractor was placed and the appropriate capsulotomy performed. Soft tissue was removed from the acetabulum. The acetabulum was sequentially reamed.   Using trial components the acetabulum was sized to a 56 mm acetabular cup. Utilizing the femoral retractor, the canal was prepared with appropriate laterization and reamed with the starter reamer. The canal was then broached progressively to size 5. The calcar planar was untilized. A trial reduction with a size +8.5 neck length was utilized. The Head size was 36mm. This was found to be the most stable to flexion greater than 90 degrees and an internal and external rotation. Limb lengths were found to be equilibrated and with appropriate stability as mentioned above. All trial components were removed. The cementless permanent stem was impacted into place. A trial reduction was performed and the appropiate neck length was selected. A permanent 36mm femoral head was impacted into place. Astrid Curtis's hip was reduced and the stability was as mentioned as above. The Irrasept lavage protocol was used. The sciatic nerve was palpated and noted to be intact. The abductors were repaired through drill holes in the greater trochanter. The remainder was closed in layers with a Zipline closure for the skin. The patient was then rolled to a supine position. The sponge and needle counts were correct. The patient tolerated the procedure without difficulty and left the operating room in satisfactory condition. 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  Item Serial No.  Lot No. LRB No. Used Action   CUP ACET [id number] HIP GRIPTION GERMAN CEMENTLESS  SER - [id number]  CUP ACET [id number] HIP GRIPTION GERMAN CEMENTLESS  SER  [facility] [employer] ORTH[employer]ED[employer]_WD [id number] Left 1 Implanted   ELIMINATOR H APEX FOR 48-60MM PINN HIP SHELL - [id number]  ELIMINATOR H APEX FOR 48-60MM PINN HIP SHELL  [facility] [employer] ORTH[employer]_WD [id number] Left 1 Implanted   LINER ACET PINN NEUT [id number] -- ALTRX - [id number]  LINER ACET PINN NEUT [id number] -- ALTRX  [employer]TH[employer]_WD [id number] Left 1 Implanted   STEM FEM SZ 5 HIP STD OFFSET CLLRD CEMENTLESS 12/14 TAPR - [id number]  STEM FEM SZ 5 HIP STD OFFSET CLLRD CEMENTLESS 12/14 TAPR  [facility] [employer] ORTHOPED[employer]_WD [id number] Left 1 Implanted   HEAD FEM [id number] +8MM OFFSET 12/14 TAPR HIP CERAMIC BIOLOX - [id number]  HEAD FEM [id number] +8MM OFFSET 12/14 TAPR HIP CERAMIC BIOLOX  [facility] [employer] [facility] ORTHOPEDICS_WD [id number] Left 1 Implanted     Signed By: Live Muniz MD

## 2022-04-05 NOTE — PROGRESS NOTES
Care Management Interventions  PCP Verified by CM: Yes  Mode of Transport at Discharge: Self  Transition of Care Consult (CM Consult): [address]: Yes  Discharge Durable Medical Equipment: No  Physical Therapy Consult: Yes  Occupational Therapy Consult: Yes  Support Systems: Parent(s)  Confirm Follow Up Transport: Self  The Plan for Transition of Care is Related to the Following Treatment Goals : improve mobility  The Patient and/or Patient Representative was Provided with a Choice of Provider and Agrees with the Discharge Plan?: Yes  Name of the Patient Representative Who was Provided with a Choice of Provider and Agrees with the Discharge Plan: pt  Freedom of Choice List was Provided with Basic Dialogue that Supports the Patient's Individualized Plan of Care/Goals, Treatment Preferences and Shares the Quality Data Associated with the Providers?: Yes  Discharge Location  Patient Expects to be Discharged to[de-identified] Home with home health  Patient is a 56y.o. year old female admitted for Left MALIK . Patient plans to return home on discharge. Order received to arrange home health. Patient without preference towards agency. Referral sent to [facility]. Patient denies any equipment needs as patient has a walker. She declined need for a 3-1 BSC. Will follow until discharge.

## 2022-04-05 NOTE — ANESTHESIA PROCEDURE NOTES
Spinal Block    Start time: 4/5/2022 7:22 AM  End time: 4/5/2022 7:25 AM  Performed by: Tobias Dozier MD  Authorized by: Tobias Dozier MD     Pre-procedure:   Indications: at surgeon's request and primary anesthetic  Preanesthetic Checklist: patient identified, risks and benefits discussed, anesthesia consent, site marked, patient being monitored and timeout performed    Timeout Time: 07:21 EDT          Spinal Block:   Patient Position:  Seated  Prep Region:  Lumbar  Prep: chlorhexidine      Location:  L3-4  Technique:  Single shot        Needle:   Needle Type:  Pencan  Needle Gauge:  24 G  Attempts:  1      Events: CSF confirmed, no blood with aspiration and no paresthesia        Assessment:  Insertion:  Uncomplicated  Patient tolerance:  Patient tolerated the procedure well with no immediate complications

## 2022-04-05 NOTE — DISCHARGE INSTRUCTIONS
[facility]   Patient Discharge Instructions    Brittaney Ahn / [id number] : 1965    Admitted 2022 Discharged: 2022     IF YOU HAVE ANY PROBLEMS ONCE YOU ARE AT HOME CALL THE FOLLOWING NUMBERS:   Main office number: [phone]    Take Home Medications         · It is important that you take the medication exactly as they are prescribed. · Keep your medication in the bottles provided by the pharmacist and keep a list of the medication names, dosages, and times to be taken in your wallet. · Do not take other medications without consulting your doctor. What to do at [address] your prehospital diet. If you have excessive nausea or vomitting call your doctor's office     Home Physical Therapy is arranged. Use rolling walker when walking. Patients who have had a joint replacement should not drive until you are seen for your follow up appointment by Dr. Napoleon Key. When to Call    - Call if you have a temperature greater then 101  - Unable to keep food down  - Loose control of your bladder or bowel function  - Are unable to bear any weight   - Need a pain medication refill     Patient Education        Hip Replacement Surgery (Posterior): What to Expect at Home  Your Recovery  Hip replacement surgery replaces the worn parts of your hip joint. When you leave the hospital, you will probably be walking with crutches or a walker. You may be able to climb a few stairs and get in and out of bed and chairs. But you will need someone to help you at home until you have more energy and can move around better. You will go home with a bandage and stitches, staples, skin glue, or tape strips. You can remove the bandage when your doctor tells you to. If you have stitches or staples, your doctor will remove them about 2 weeks after your surgery. Glue or tape strips will fall off on their own over time.  You may still have some mild pain, and the area may be swollen for 3 to 4 months after surgery. Your doctor may give you medicine for the pain. You will continue the rehabilitation program (rehab) you started in the hospital. The better you do with your rehab exercises, the sooner you will get your strength and movement back. Most people are able to return to work 4 weeks to 4 months after surgery. This care sheet gives you a general idea about how long it will take for you to recover. But each person recovers at a different pace. Follow the steps below to get better as quickly as possible. How can you care for yourself at home? Activity    · Your doctor may not want your affected leg to cross the center of your body toward the other leg. If so, your therapist may suggest these ideas:  ? Do not cross your legs. ? Be very careful as you get in or out of bed or a car so your leg does not cross the imaginary line in the middle of your body.     · Go slowly when you climb stairs. Make sure the lights are on. Have someone watch you, if you can. When you climb stairs:  ? Step up first with your unaffected leg. Then bring the affected leg up to the same step. Bring your crutches or cane up. ? To go down stairs, reverse the order. First, put your crutches or cane on the lower step. Then bring the affected leg down to that step. Finally, step down with the unaffected leg.     · You can ride in a car, but stop at least once every hour to get out and walk around.     · You may want to sleep on your back. Don't reach down too far to pull up blankets when you lie in bed.     · If your doctor recommends exercises, do them as directed. You can cut back on your exercises if your muscles start to ache, but don't stop doing them.     · Rest when you feel tired. You may take a nap, but don't stay in bed all day.     · Work with your physical therapist to learn the best way to exercise.  You will probably have to use a walker, crutches, or a cane for at least 4 to 6 weeks.     · Your doctor may advise you to stay away from activities that put stress on the joint. This includes sports such as tennis, football, and jogging.     · Try not to sit for too long at one time. You will feel less stiff if you take a short walk about every hour. When you sit, use chairs with arms, and don't sit in low chairs.     · Do not bend over more than 90 degrees (like the angle in a letter \"L\").     · Sleep on your back with your legs slightly apart or on your side with a pillow between your knees for about 6 weeks or as your doctor tells you. Do not sleep on your stomach or affected leg.     · Ask your doctor when you can drive again.     · Most people are able to return to work 4 weeks to 4 months after surgery.     · Ask your doctor when it is okay for you to have sex. Diet    · By the time you leave the hospital, you will probably be eating your normal diet. Your doctor may recommend that you take iron and vitamin supplements.     · Drink plenty of fluids (unless your doctor tells you not to).   · Eat healthy foods, and watch your portion sizes. Try to stay at your ideal weight. Too much weight puts more stress on your new hip joint.     · You may notice that your bowel movements are not regular right after your surgery. This is common. Try to avoid constipation and straining with bowel movements. You may want to take a fiber supplement every day. If you have not had a bowel movement after a couple of days, ask your doctor about taking a mild laxative. Medicines    · Your doctor will tell you if and when you can restart your medicines. You will also get instructions about taking any new medicines.     · If you take aspirin or some other blood thinner, ask your doctor if and when to start taking it again. Make sure that you understand exactly what your doctor wants you to do.     · Your doctor may give you a blood-thinning medicine to prevent blood clots.  If you take a blood thinner, be sure you get instructions about how to take your medicine safely. Blood thinners can cause serious bleeding problems. This medicine could be in pill form or as a shot (injection). If a shot is necessary, your doctor will tell you how to do this.     · Be safe with medicines. Take pain medicines exactly as directed. ? If the doctor gave you a prescription medicine for pain, take it as prescribed. ? If you are not taking a prescription pain medicine, ask your doctor if you can take an over-the-counter medicine.     · If you think your pain medicine is making you sick to your stomach:  ? Take your medicine after meals (unless your doctor has told you not to). ? Ask your doctor for a different pain medicine.     · If your doctor prescribed antibiotics, take them as directed. Do not stop taking them just because you feel better. You need to take the full course of antibiotics. Incision care    · If your doctor told you how to care for your cut (incision), follow your doctor's instructions. You will have a dressing over the cut. A dressing helps the incision heal and protects it. Your doctor will tell you how to take care of this.     · If you did not get instructions, follow this general advice:  ? If you have strips of tape on the cut the doctor made, leave the tape on for a week or until it falls off.  ? If you have stitches or staples, your doctor will tell you when to come back to have them removed. ? If you have skin glue on the cut, leave it on until it falls off. Skin glue is also called skin adhesive or liquid stitches. ? Change the bandage every day. ? Wash the area daily with warm water, and pat it dry. Don't use hydrogen peroxide or alcohol. They can slow healing. ? You may cover the area with a gauze bandage if it oozes fluid or rubs against clothing. ? You may shower 24 to 48 hours after surgery. Pat the incision dry. Don't swim or take a bath for the first 2 weeks, or until your doctor tells you it is okay.    Exercise    · Your physical therapist will teach you exercises to do at home. Always do them as your therapist tells you.     · Avoid activities where you might fall. Ice and elevation    · For pain, put ice or a cold pack on the area for 10 to 20 minutes at a time. Put a thin cloth between the ice and your skin. If your doctor recommended cold therapy using a portable machine, follow the instructions that came with the machine.     · Your ankle may swell for about 3 months. Prop up your ankle when you ice it or anytime you sit or lie down. Try to keep it above the level of your heart. This will help reduce swelling. Other instructions    · Wear compression stockings if your doctor told you to. These may help to prevent blood clots. Your doctor will tell you how long you need to keep wearing the compression stockings.     · Try to prevent falls. To avoid falling:  ? Arrange furniture so that you will not trip on it. ? Get rid of throw rugs, and move electrical cords out of the way. ? Walk only in areas with plenty of light. ? Put grab bars in showers and bathtubs. ? Try to avoid icy or snowy sidewalks. Choose shoes with good traction, or consider using traction devices that attach to your shoes. ? Wear shoes with sturdy, flat soles. Follow-up care is a key part of your treatment and safety. Be sure to make and go to all appointments, and call your doctor if you are having problems. It's also a good idea to know your test results and keep a list of the medicines you take. When should you call for help? Call 911 anytime you think you may need emergency care. For example, call if:    · You passed out (lost consciousness).     · You have severe trouble breathing.     · You have sudden chest pain and shortness of breath, or you cough up blood. Call your doctor now or seek immediate medical care if:    · You have signs that your hip may be dislocated, including:  ? Severe pain and not being able to stand.   ? A crooked leg that looks like your hip is out of position. ? Not being able to bend or straighten your leg.     · Your leg or foot is cool or pale or changes color.     · You cannot feel or move your leg.     · You have signs of a blood clot, such as:  ? Pain in your calf, back of the knee, thigh, or groin. ? Redness and swelling in your leg or groin.     · Your incision comes open and begins to bleed, or the bleeding increases.     · You feel like your heart is racing or beating irregularly.     · You have signs of infection, such as:  ? Increased pain, swelling, warmth, or redness. ? Red streaks leading from the incision. ? Pus draining from the incision. ? A fever. Watch closely for changes in your health, and be sure to contact your doctor if:    · You do not have a bowel movement after taking a laxative.     · You do not get better as expected. Where can you learn more? Go to http://www.gray.com/  Enter Q746 in the search box to learn more about \"Hip Replacement Surgery (Posterior): What to Expect at Home. \"  Current as of: July 1, 2021               Content Version: 13.2  © 9162-1254 [employer]. Care instructions adapted under license by [employer] (which disclaims liability or warranty for this information). If you have questions about a medical condition or this instruction, always ask your healthcare professional. [place] [address] any warranty or liability for your use of this information. Information obtained by :  I understand that if any problems occur once I am at home I am to contact my physician. I understand and acknowledge receipt of the instructions indicated above.                                                                                                                                            Physician's or R.N.'s Signature                                                                  Date/Time Patient or Representative Signature                                                          Date/Time

## 2022-04-06 ENCOUNTER — HOME CARE VISIT (OUTPATIENT)
Dept: SCHEDULING | Facility: HOME HEALTH | Age: 57
End: 2022-04-06
Payer: MEDICAID

## 2022-04-06 ENCOUNTER — NURSE NAVIGATOR (OUTPATIENT)
Dept: REHABILITATION | Age: 57
End: 2022-04-06

## 2022-04-06 VITALS
OXYGEN SATURATION: 98 % | RESPIRATION RATE: 16 BRPM | SYSTOLIC BLOOD PRESSURE: 160 MMHG | HEART RATE: 78 BPM | DIASTOLIC BLOOD PRESSURE: 98 MMHG | TEMPERATURE: 96.9 F

## 2022-04-06 PROCEDURE — 400013 HH SOC

## 2022-04-06 PROCEDURE — G0151 HHCP-SERV OF PT,EA 15 MIN: HCPCS

## 2022-04-06 NOTE — PROGRESS NOTES
Called patient to follow up after MALIK with same day discharge on 4/5/22. [facility] PT currently in the home for start of care visit. Reviewed contact number for ortho navigator.

## 2022-04-07 NOTE — ANESTHESIA POSTPROCEDURE EVALUATION
Procedure(s):  LEFT HIP ARTHROPLASTY TOTAL DEPUY/**SDD**.    spinal    Anesthesia Post Evaluation      Multimodal analgesia: multimodal analgesia used between 6 hours prior to anesthesia start to PACU discharge  Patient location during evaluation: PACU  Patient participation: complete - patient participated  Level of consciousness: awake and alert  Pain management: adequate  Airway patency: patent  Anesthetic complications: no  Cardiovascular status: acceptable and hemodynamically stable  Respiratory status: acceptable  Hydration status: acceptable  Post anesthesia nausea and vomiting:  none  Final Post Anesthesia Temperature Assessment:  Normothermia (36.0-37.5 degrees C)      INITIAL Post-op Vital signs:   Vitals Value Taken Time   /75 04/05/22 0917   Temp 36.5 °C (97.7 °F) 04/05/22 0902   Pulse 71 04/05/22 0917   Resp 16 04/05/22 0917   SpO2 99 % 04/05/22 0917

## 2022-04-08 ENCOUNTER — HOME CARE VISIT (OUTPATIENT)
Dept: SCHEDULING | Facility: HOME HEALTH | Age: 57
End: 2022-04-08
Payer: MEDICAID

## 2022-04-08 VITALS
RESPIRATION RATE: 16 BRPM | DIASTOLIC BLOOD PRESSURE: 78 MMHG | TEMPERATURE: 97.3 F | SYSTOLIC BLOOD PRESSURE: 128 MMHG | HEART RATE: 80 BPM | OXYGEN SATURATION: 98 %

## 2022-04-08 PROCEDURE — G0151 HHCP-SERV OF PT,EA 15 MIN: HCPCS

## 2022-04-12 ENCOUNTER — HOME CARE VISIT (OUTPATIENT)
Dept: SCHEDULING | Facility: HOME HEALTH | Age: 57
End: 2022-04-12
Payer: MEDICAID

## 2022-04-12 VITALS
DIASTOLIC BLOOD PRESSURE: 78 MMHG | OXYGEN SATURATION: 98 % | HEART RATE: 76 BPM | RESPIRATION RATE: 17 BRPM | TEMPERATURE: 97.8 F | SYSTOLIC BLOOD PRESSURE: 120 MMHG

## 2022-04-12 PROCEDURE — G0157 HHC PT ASSISTANT EA 15: HCPCS

## 2022-04-13 ENCOUNTER — HOME CARE VISIT (OUTPATIENT)
Dept: SCHEDULING | Facility: HOME HEALTH | Age: 57
End: 2022-04-13
Payer: MEDICAID

## 2022-04-13 VITALS
HEART RATE: 80 BPM | TEMPERATURE: 98.6 F | RESPIRATION RATE: 16 BRPM | DIASTOLIC BLOOD PRESSURE: 74 MMHG | OXYGEN SATURATION: 97 % | SYSTOLIC BLOOD PRESSURE: 120 MMHG

## 2022-04-13 PROCEDURE — G0157 HHC PT ASSISTANT EA 15: HCPCS

## 2022-04-15 ENCOUNTER — HOME CARE VISIT (OUTPATIENT)
Dept: SCHEDULING | Facility: HOME HEALTH | Age: 57
End: 2022-04-15
Payer: MEDICAID

## 2022-04-15 VITALS
OXYGEN SATURATION: 98 % | TEMPERATURE: 98.4 F | DIASTOLIC BLOOD PRESSURE: 86 MMHG | RESPIRATION RATE: 18 BRPM | SYSTOLIC BLOOD PRESSURE: 136 MMHG | HEART RATE: 82 BPM

## 2022-04-15 PROCEDURE — G0157 HHC PT ASSISTANT EA 15: HCPCS

## 2022-04-18 ENCOUNTER — HOME CARE VISIT (OUTPATIENT)
Dept: SCHEDULING | Facility: HOME HEALTH | Age: 57
End: 2022-04-18
Payer: MEDICAID

## 2022-04-18 VITALS
TEMPERATURE: 98.5 F | HEART RATE: 80 BPM | SYSTOLIC BLOOD PRESSURE: 134 MMHG | RESPIRATION RATE: 17 BRPM | OXYGEN SATURATION: 98 % | DIASTOLIC BLOOD PRESSURE: 82 MMHG

## 2022-04-18 PROCEDURE — G0157 HHC PT ASSISTANT EA 15: HCPCS

## 2022-04-20 ENCOUNTER — HOME CARE VISIT (OUTPATIENT)
Dept: HOME HEALTH SERVICES | Facility: HOME HEALTH | Age: 57
End: 2022-04-20
Payer: MEDICAID

## 2022-04-22 ENCOUNTER — HOME CARE VISIT (OUTPATIENT)
Dept: SCHEDULING | Facility: HOME HEALTH | Age: 57
End: 2022-04-22
Payer: MEDICAID

## 2022-04-22 PROCEDURE — G0151 HHCP-SERV OF PT,EA 15 MIN: HCPCS

## 2022-04-24 VITALS
SYSTOLIC BLOOD PRESSURE: 124 MMHG | DIASTOLIC BLOOD PRESSURE: 78 MMHG | OXYGEN SATURATION: 98 % | TEMPERATURE: 97.7 F | HEART RATE: 82 BPM | RESPIRATION RATE: 18 BRPM

## 2022-07-25 LAB — MAMMOGRAPHY, EXTERNAL: NORMAL

## 2022-08-29 ENCOUNTER — TELEPHONE (OUTPATIENT)
Dept: FAMILY MEDICINE CLINIC | Facility: CLINIC | Age: 57
End: 2022-08-29

## 2022-08-29 NOTE — TELEPHONE ENCOUNTER
----- Message from Abiola Larose sent at 8/29/2022  4:00 PM EDT -----  Subject: Appointment Request    Reason for Call: New Patient/New to Provider Appointment needed: New   Patient Request Appointment    QUESTIONS    Reason for appointment request? No appointments available during search     Additional Information for Provider?  Pt would like to establish care with   PCp missed appt today due to possibly having covid please contact as soon   as possible, Was booking with Nic Titus.   ---------------------------------------------------------------------------  --------------  [address] [employer]  [phone]; OK to leave message on voicemail  ---------------------------------------------------------------------------  --------------  SCRIPT ANSWERS  COVID Screen: Kaykay Faustin

## 2023-01-25 LAB — MAMMOGRAPHY, EXTERNAL: NORMAL

## 2023-02-10 ENCOUNTER — OFFICE VISIT (OUTPATIENT)
Dept: INTERNAL MEDICINE CLINIC | Facility: CLINIC | Age: 58
End: 2023-02-10
Payer: MEDICAID

## 2023-02-10 VITALS
OXYGEN SATURATION: 99 % | HEIGHT: 63 IN | WEIGHT: 226 LBS | HEART RATE: 96 BPM | DIASTOLIC BLOOD PRESSURE: 80 MMHG | BODY MASS INDEX: 40.04 KG/M2 | SYSTOLIC BLOOD PRESSURE: 158 MMHG

## 2023-02-10 DIAGNOSIS — E78.2 MIXED HYPERLIPIDEMIA: ICD-10-CM

## 2023-02-10 DIAGNOSIS — I10 PRIMARY HYPERTENSION: ICD-10-CM

## 2023-02-10 DIAGNOSIS — R73.01 IFG (IMPAIRED FASTING GLUCOSE): Primary | ICD-10-CM

## 2023-02-10 PROCEDURE — 3077F SYST BP >= 140 MM HG: CPT | Performed by: INTERNAL MEDICINE

## 2023-02-10 PROCEDURE — 99204 OFFICE O/P NEW MOD 45 MIN: CPT | Performed by: INTERNAL MEDICINE

## 2023-02-10 PROCEDURE — 3079F DIAST BP 80-89 MM HG: CPT | Performed by: INTERNAL MEDICINE

## 2023-02-10 RX ORDER — HYDROCHLOROTHIAZIDE 25 MG/1
25 TABLET ORAL DAILY
Qty: 30 TABLET | Refills: 2 | Status: SHIPPED | OUTPATIENT
Start: 2023-02-10

## 2023-02-10 RX ORDER — ATORVASTATIN CALCIUM 10 MG/1
10 TABLET, FILM COATED ORAL DAILY
Qty: 30 TABLET | Refills: 2 | Status: SHIPPED | OUTPATIENT
Start: 2023-02-10

## 2023-02-10 RX ORDER — LISINOPRIL 10 MG/1
10 TABLET ORAL DAILY
Qty: 30 TABLET | Refills: 2 | Status: SHIPPED | OUTPATIENT
Start: 2023-02-10

## 2023-02-10 SDOH — ECONOMIC STABILITY: FOOD INSECURITY: WITHIN THE PAST 12 MONTHS, YOU WORRIED THAT YOUR FOOD WOULD RUN OUT BEFORE YOU GOT MONEY TO BUY MORE.: NEVER TRUE

## 2023-02-10 SDOH — ECONOMIC STABILITY: INCOME INSECURITY: HOW HARD IS IT FOR YOU TO PAY FOR THE VERY BASICS LIKE FOOD, HOUSING, MEDICAL CARE, AND HEATING?: NOT HARD AT ALL

## 2023-02-10 SDOH — ECONOMIC STABILITY: FOOD INSECURITY: WITHIN THE PAST 12 MONTHS, THE FOOD YOU BOUGHT JUST DIDN'T LAST AND YOU DIDN'T HAVE MONEY TO GET MORE.: NEVER TRUE

## 2023-02-10 SDOH — ECONOMIC STABILITY: HOUSING INSECURITY
IN THE LAST 12 MONTHS, WAS THERE A TIME WHEN YOU DID NOT HAVE A STEADY PLACE TO SLEEP OR SLEPT IN A SHELTER (INCLUDING NOW)?: NO

## 2023-02-10 ASSESSMENT — PATIENT HEALTH QUESTIONNAIRE - PHQ9
SUM OF ALL RESPONSES TO PHQ9 QUESTIONS 1 & 2: 2
SUM OF ALL RESPONSES TO PHQ QUESTIONS 1-9: 2
2. FEELING DOWN, DEPRESSED OR HOPELESS: 1
1. LITTLE INTEREST OR PLEASURE IN DOING THINGS: 1
SUM OF ALL RESPONSES TO PHQ QUESTIONS 1-9: 2

## 2023-02-10 ASSESSMENT — ENCOUNTER SYMPTOMS
VOMITING: 0
DIARRHEA: 0
SINUS PAIN: 0
ABDOMINAL PAIN: 0
WHEEZING: 0
NAUSEA: 0
SHORTNESS OF BREATH: 0
CONSTIPATION: 0

## 2023-02-10 NOTE — PROGRESS NOTES
Armani Shipley was seen today for new patient and medication refill. Diagnoses and all orders for this visit:    IFG (impaired fasting glucose)  -     Hemoglobin A1C; Future  -     Lipid Panel; Future  -     CBC; Future  -     Comprehensive Metabolic Panel; Future  -     TSH; Future  -     Microalbumin / Creatinine Urine Ratio; Future    Mixed hyperlipidemia  -     atorvastatin (LIPITOR) 10 MG tablet; Take 1 tablet by mouth daily  -     Hemoglobin A1C; Future  -     Lipid Panel; Future  -     CBC; Future  -     Comprehensive Metabolic Panel; Future  -     TSH; Future  -     Microalbumin / Creatinine Urine Ratio; Future    Primary hypertension  -     hydroCHLOROthiazide (HYDRODIURIL) 25 MG tablet; Take 1 tablet by mouth daily  -     lisinopril (PRINIVIL;ZESTRIL) 10 MG tablet; Take 1 tablet by mouth daily  -     Hemoglobin A1C; Future  -     Lipid Panel; Future  -     CBC; Future  -     Comprehensive Metabolic Panel; Future  -     TSH; Future  -     Microalbumin / Creatinine Urine Ratio; Future        Alejandra Darcy is a 66 y.o. female    Chief Complaint   Patient presents with    New Patient     Needs PCP just got insurance    Medication Refill     Been out of med's last seen Dr in  except hip surgery 2022   Trying to quit smoking]--is on the road   Htn--  Mother  lung cancer  recently]  Disabled-hip arthritis s/p replacement--no injury     No results found for any previous visit.         Past Medical History:   Diagnosis Date    Anxiety     BMI 35.0-35.9,adult     Current smoker     <0.25 pack/day    High cholesterol     on med for control     History of Bell's palsy     Hypertension     on med for control     OA (osteoarthritis)        Family History   Problem Relation Age of Onset    Cancer Mother         Social History     Socioeconomic History    Marital status: Single     Spouse name: Not on file    Number of children: Not on file    Years of education: Not on file    Highest education level: Not on file Occupational History    Not on file   Tobacco Use    Smoking status: Every Day     Packs/day: 0.25     Types: Cigarettes    Smokeless tobacco: Never   Vaping Use    Vaping Use: Never used   Substance and Sexual Activity    Alcohol use: Not Currently    Drug use: Not Currently     Types: Marijuana Delcie Ashu)    Sexual activity: Not on file   Other Topics Concern    Not on file   Social History Narrative    Not on file     Social Determinants of Health     Financial Resource Strain: Low Risk     Difficulty of Paying Living Expenses: Not hard at all   Food Insecurity: No Food Insecurity    Worried About [address] in the Last Year: Never true    [address] in the Last Year: Never true   Transportation Needs: Unknown    Lack of Transportation (Medical): Not on file    Lack of Transportation (Non-Medical): No   Physical Activity: Not on file   Stress: Not on file   Social Connections: Not on file   Intimate Partner Violence: Not on file   Housing Stability: Unknown    Unable to Pay for Housing in the Last Year: Not on file    Number of Places Lived in the Last Year: Not on file    Unstable Housing in the Last Year: No         Current Outpatient Medications:     hydroCHLOROthiazide (HYDRODIURIL) 25 MG tablet, Take 1 tablet by mouth daily, Disp: 30 tablet, Rfl: 2    lisinopril (PRINIVIL;ZESTRIL) 10 MG tablet, Take 1 tablet by mouth daily, Disp: 30 tablet, Rfl: 2    atorvastatin (LIPITOR) 10 MG tablet, Take 1 tablet by mouth daily, Disp: 30 tablet, Rfl: 2    No Known Allergies      Review of Systems   Constitutional:  Negative for appetite change, chills, fatigue and fever. HENT:  Negative for sinus pain. Respiratory:  Negative for shortness of breath and wheezing. Cardiovascular:  Negative for chest pain. Gastrointestinal:  Negative for abdominal pain, constipation, diarrhea, nausea and vomiting. Genitourinary:  Negative for dysuria and frequency. Musculoskeletal:  Positive for arthralgias. Negative for myalgias. Neurological:  Negative for dizziness. Psychiatric/Behavioral:  Positive for dysphoric mood. Negative for agitation and suicidal ideas. All other systems reviewed and are negative. Vitals:    02/10/23 0957 02/10/23 1013   BP: (!) 160/80 (!) 158/80   Site: Left Upper Arm    Position: Sitting    Cuff Size: Large Adult    Pulse: 96    SpO2: 99%    Weight: 226 lb (102.5 kg)    Height: 5' 3\" (1.6 m)            Physical Exam  Constitutional:       General: She is not in acute distress. Appearance: She is obese. She is not ill-appearing. Eyes:      Extraocular Movements: Extraocular movements intact. Pulmonary:      Effort: Pulmonary effort is normal.   Skin:     Coloration: Skin is not jaundiced. Neurological:      General: No focal deficit present. Mental Status: She is alert. Psychiatric:         Mood and Affect: Mood normal.         Behavior: Behavior is cooperative. Thought Content: Thought content normal.          Amrani Shipley was seen today for new patient and medication refill. Diagnoses and all orders for this visit:    IFG (impaired fasting glucose)  -     Hemoglobin A1C; Future  -     Lipid Panel; Future  -     CBC; Future  -     Comprehensive Metabolic Panel; Future  -     TSH; Future  -     Microalbumin / Creatinine Urine Ratio; Future    Mixed hyperlipidemia  -     atorvastatin (LIPITOR) 10 MG tablet; Take 1 tablet by mouth daily  -     Hemoglobin A1C; Future  -     Lipid Panel; Future  -     CBC; Future  -     Comprehensive Metabolic Panel; Future  -     TSH; Future  -     Microalbumin / Creatinine Urine Ratio; Future    Primary hypertension  -     hydroCHLOROthiazide (HYDRODIURIL) 25 MG tablet; Take 1 tablet by mouth daily  -     lisinopril (PRINIVIL;ZESTRIL) 10 MG tablet; Take 1 tablet by mouth daily  -     Hemoglobin A1C; Future  -     Lipid Panel; Future  -     CBC; Future  -     Comprehensive Metabolic Panel; Future  -     TSH;  Future  - Microalbumin / Creatinine Urine Ratio; Future        F/u 1 mo labs and bp check back on meds.  Follow up depression       Jh Vazquez DO

## 2023-03-21 ENCOUNTER — OFFICE VISIT (OUTPATIENT)
Dept: INTERNAL MEDICINE CLINIC | Facility: CLINIC | Age: 58
End: 2023-03-21
Payer: MEDICAID

## 2023-03-21 VITALS
OXYGEN SATURATION: 100 % | WEIGHT: 230 LBS | HEART RATE: 67 BPM | TEMPERATURE: 98.1 F | BODY MASS INDEX: 40.75 KG/M2 | SYSTOLIC BLOOD PRESSURE: 142 MMHG | HEIGHT: 63 IN | DIASTOLIC BLOOD PRESSURE: 80 MMHG

## 2023-03-21 DIAGNOSIS — Z91.89 HAS POORLY BALANCED DIET: ICD-10-CM

## 2023-03-21 DIAGNOSIS — E78.2 MIXED HYPERLIPIDEMIA: ICD-10-CM

## 2023-03-21 DIAGNOSIS — N28.9 RENAL INSUFFICIENCY: ICD-10-CM

## 2023-03-21 DIAGNOSIS — R73.01 IFG (IMPAIRED FASTING GLUCOSE): Primary | ICD-10-CM

## 2023-03-21 DIAGNOSIS — I10 PRIMARY HYPERTENSION: ICD-10-CM

## 2023-03-21 LAB
ERYTHROCYTE [DISTWIDTH] IN BLOOD BY AUTOMATED COUNT: 13.3 % (ref 11.9–14.6)
HCT VFR BLD AUTO: 41.4 % (ref 35.8–46.3)
HGB BLD-MCNC: 13.2 G/DL (ref 11.7–15.4)
MCH RBC QN AUTO: 29.5 PG (ref 26.1–32.9)
MCHC RBC AUTO-ENTMCNC: 31.9 G/DL (ref 31.4–35)
MCV RBC AUTO: 92.6 FL (ref 82–102)
NRBC # BLD: 0 K/UL (ref 0–0.2)
PLATELET # BLD AUTO: 314 K/UL (ref 150–450)
PMV BLD AUTO: 10.6 FL (ref 9.4–12.3)
RBC # BLD AUTO: 4.47 M/UL (ref 4.05–5.2)
WBC # BLD AUTO: 6.2 K/UL (ref 4.3–11.1)

## 2023-03-21 PROCEDURE — 3077F SYST BP >= 140 MM HG: CPT | Performed by: INTERNAL MEDICINE

## 2023-03-21 PROCEDURE — 99214 OFFICE O/P EST MOD 30 MIN: CPT | Performed by: INTERNAL MEDICINE

## 2023-03-21 PROCEDURE — 3079F DIAST BP 80-89 MM HG: CPT | Performed by: INTERNAL MEDICINE

## 2023-03-21 RX ORDER — LISINOPRIL 20 MG/1
20 TABLET ORAL DAILY
Qty: 30 TABLET | Refills: 5 | Status: SHIPPED | OUTPATIENT
Start: 2023-03-21

## 2023-03-21 ASSESSMENT — PATIENT HEALTH QUESTIONNAIRE - PHQ9
1. LITTLE INTEREST OR PLEASURE IN DOING THINGS: 0
SUM OF ALL RESPONSES TO PHQ QUESTIONS 1-9: 0
SUM OF ALL RESPONSES TO PHQ QUESTIONS 1-9: 0
SUM OF ALL RESPONSES TO PHQ9 QUESTIONS 1 & 2: 0
2. FEELING DOWN, DEPRESSED OR HOPELESS: 0
SUM OF ALL RESPONSES TO PHQ QUESTIONS 1-9: 0
SUM OF ALL RESPONSES TO PHQ QUESTIONS 1-9: 0

## 2023-03-21 ASSESSMENT — ENCOUNTER SYMPTOMS
ABDOMINAL PAIN: 0
SHORTNESS OF BREATH: 0
DIARRHEA: 0
VOMITING: 0
CONSTIPATION: 0
NAUSEA: 0
SINUS PAIN: 0
WHEEZING: 0

## 2023-03-21 NOTE — PROGRESS NOTES
Armani Shipley was seen today for follow-up. Diagnoses and all orders for this visit:    IFG (impaired fasting glucose)  -     [facility]  -     Microalbumin / Creatinine Urine Ratio  -     TSH  -     Comprehensive Metabolic Panel  -     CBC  -     Lipid Panel  -     Hemoglobin A1C    Primary hypertension  -     lisinopril (PRINIVIL;ZESTRIL) 20 MG tablet; Take 1 tablet by mouth daily  -     [facility]  -     Microalbumin / Creatinine Urine Ratio  -     TSH  -     Comprehensive Metabolic Panel  -     CBC  -     Lipid Panel  -     Hemoglobin A1C  -     Comprehensive Metabolic Panel; Future  -     Lipid Panel; Future    Has poorly balanced diet  -     [facility]    Mixed hyperlipidemia  -     Microalbumin / Creatinine Urine Ratio  -     TSH  -     Comprehensive Metabolic Panel  -     CBC  -     Lipid Panel  -     Hemoglobin A1C    Renal insufficiency  -     [facility]    GOING UP ON LISINOPRIL. Alejandra Taveras is a 66 y.o. female    Chief Complaint   Patient presents with    Follow-up     1 month F/U on B/P     BP AT HOME RUNNING 120/90S    No results found for any previous visit.         Past Medical History:   Diagnosis Date    Anxiety     BMI 35.0-35.9,adult     Current smoker     <0.25 pack/day    High cholesterol     on med for control     History of Bell's palsy     Hypertension     on med for control     OA (osteoarthritis)        Family History   Problem Relation Age of Onset    Cancer Mother         Social History     Socioeconomic History    Marital status: Single     Spouse name: Not on file    Number of children: Not on file    Years of education: Not on file    Highest education level: Not on file   Occupational History    Not on file   Tobacco Use    Smoking status: Every Day     Packs/day: 0.25     Types: Cigarettes    Smokeless tobacco: Never   Vaping Use    Vaping Use:

## 2023-03-22 LAB
ALBUMIN SERPL-MCNC: 3.5 G/DL (ref 3.5–5)
ALBUMIN/GLOB SERPL: 1 (ref 0.4–1.6)
ALP SERPL-CCNC: 124 U/L (ref 50–136)
ALT SERPL-CCNC: 15 U/L (ref 12–65)
ANION GAP SERPL CALC-SCNC: 6 MMOL/L (ref 2–11)
AST SERPL-CCNC: 31 U/L (ref 15–37)
BILIRUB SERPL-MCNC: 0.4 MG/DL (ref 0.2–1.1)
BUN SERPL-MCNC: 22 MG/DL (ref 6–23)
CALCIUM SERPL-MCNC: 9.1 MG/DL (ref 8.3–10.4)
CHLORIDE SERPL-SCNC: 106 MMOL/L (ref 101–110)
CHOLEST SERPL-MCNC: 160 MG/DL
CO2 SERPL-SCNC: 25 MMOL/L (ref 21–32)
CREAT SERPL-MCNC: 2.1 MG/DL (ref 0.6–1)
CREAT UR-MCNC: 126 MG/DL
EST. AVERAGE GLUCOSE BLD GHB EST-MCNC: 97 MG/DL
GLOBULIN SER CALC-MCNC: 3.6 G/DL (ref 2.8–4.5)
GLUCOSE SERPL-MCNC: 106 MG/DL (ref 65–100)
HBA1C MFR BLD: 5 % (ref 4.8–5.6)
HDLC SERPL-MCNC: 69 MG/DL (ref 40–60)
HDLC SERPL: 2.3
LDLC SERPL CALC-MCNC: 79.6 MG/DL
MICROALBUMIN UR-MCNC: 0.66 MG/DL (ref 0–3)
MICROALBUMIN/CREAT UR-RTO: 5 MG/G (ref 0–30)
POTASSIUM SERPL-SCNC: 4.6 MMOL/L (ref 3.5–5.1)
PROT SERPL-MCNC: 7.1 G/DL (ref 6.3–8.2)
SODIUM SERPL-SCNC: 137 MMOL/L (ref 133–143)
TRIGL SERPL-MCNC: 57 MG/DL (ref 35–150)
TSH, 3RD GENERATION: 2.53 UIU/ML (ref 0.36–3.74)
VLDLC SERPL CALC-MCNC: 11.4 MG/DL (ref 6–23)

## 2023-05-10 ENCOUNTER — OFFICE VISIT (OUTPATIENT)
Dept: ORTHOPEDIC SURGERY | Age: 58
End: 2023-05-10

## 2023-05-10 DIAGNOSIS — M54.50 CHRONIC LEFT-SIDED LOW BACK PAIN WITHOUT SCIATICA: ICD-10-CM

## 2023-05-10 DIAGNOSIS — G89.29 CHRONIC LEFT-SIDED LOW BACK PAIN WITHOUT SCIATICA: ICD-10-CM

## 2023-05-10 DIAGNOSIS — E66.01 MORBID OBESITY (HCC): ICD-10-CM

## 2023-05-10 DIAGNOSIS — Z96.642 PRESENCE OF LEFT ARTIFICIAL HIP JOINT: Primary | ICD-10-CM

## 2023-05-10 RX ORDER — DICLOFENAC SODIUM 75 MG/1
75 TABLET, DELAYED RELEASE ORAL 2 TIMES DAILY
Qty: 60 TABLET | Refills: 0 | Status: SHIPPED | OUTPATIENT
Start: 2023-05-10

## 2023-05-10 NOTE — PROGRESS NOTES
Name: Emerita García  YOB: 1965  Gender: female  MRN: [id number]            Post-Op left MARTINE: annual    The patient returns now 1 years s/p left MARTINE. They are doing well. The patient states they can ambulate for unlimited distances. The patient never uses a cane or assistive devices. The patient is able to climb stairs normally with rail. The patient uses pain medicines rarely. The patient has had no significant complications since they were last seen. She does have a complaint of chronic low back pain. She does not have any pain that runs down her leg. She does note a bit of a limp but it is not painful. Past Medical History: Allergies:  No Known Allergies    Medications:  Current Outpatient Medications   Medication Sig    lisinopril (PRINIVIL;ZESTRIL) 20 MG tablet Take 1 tablet by mouth daily    hydroCHLOROthiazide (HYDRODIURIL) 25 MG tablet Take 1 tablet by mouth daily    atorvastatin (LIPITOR) 10 MG tablet Take 1 tablet by mouth daily     No current facility-administered medications for this visit. Past Medical history:  Past Medical History:   Diagnosis Date    Anxiety     BMI 35.0-35.9,adult     Current smoker     <0.25 pack/day    High cholesterol     on med for control     History of Bell's palsy     Hypertension     on med for control     OA (osteoarthritis)         has a past surgical history that includes Colonoscopy; Appendectomy; Tonsillectomy; and joint replacement (Left). Family History:  [unfilled]     Social History:  [unfilled]    Review of Systems:       PE: The patient is alert, awake and cooperative to examination. Ambulates with a reciprocal heel toe gait with a mild to moderate. Incision looks good and is clean, dry and intact. No sign of infection. RADIOGRAPHS:  AP pelvis and table lateral of the left  hip which demonstrates well fixed MARTINE with cementless type fixation.   No significant osteolysis or other concerns

## 2023-06-07 ENCOUNTER — EVALUATION (OUTPATIENT)
Age: 58
End: 2023-06-07
Payer: MEDICAID

## 2023-06-07 DIAGNOSIS — G89.29 CHRONIC BILATERAL LOW BACK PAIN WITHOUT SCIATICA: Primary | ICD-10-CM

## 2023-06-07 DIAGNOSIS — Z96.642 PRESENCE OF LEFT ARTIFICIAL HIP JOINT: ICD-10-CM

## 2023-06-07 DIAGNOSIS — M54.50 CHRONIC LEFT-SIDED LOW BACK PAIN WITHOUT SCIATICA: ICD-10-CM

## 2023-06-07 DIAGNOSIS — G89.29 CHRONIC LEFT-SIDED LOW BACK PAIN WITHOUT SCIATICA: ICD-10-CM

## 2023-06-07 DIAGNOSIS — M54.50 CHRONIC BILATERAL LOW BACK PAIN WITHOUT SCIATICA: Primary | ICD-10-CM

## 2023-06-07 DIAGNOSIS — M62.81 MUSCLE WEAKNESS: ICD-10-CM

## 2023-06-07 DIAGNOSIS — R26.9 ABNORMALITY OF GAIT: ICD-10-CM

## 2023-06-07 PROCEDURE — 97110 THERAPEUTIC EXERCISES: CPT | Performed by: PHYSICAL THERAPIST

## 2023-06-07 PROCEDURE — 97161 PT EVAL LOW COMPLEX 20 MIN: CPT | Performed by: PHYSICAL THERAPIST

## 2023-06-07 NOTE — PROGRESS NOTES
No    Pain Assessment:  Average Pain/symptom intensity (0-10 scale). Ranges from 0-10/10  How often do you feel symptoms? Constant (% of time)  Description:  hard to describe  Aggravating factors: rolling over in bed, sit-stand, bending, lifting , prolonged sitting, prolonged standing, and walking  Alleviating factors: lying down and rest    Social/Functional Hx: How would you rate your overall health? good  Pt lives alone in a(n) apartment but spending most of her time with a friend in 1 story house without exterior steps. Current DME: straight cane and rolling walker  Work Status: Disability due to hip   Sleep: severely disturbed and has diffciulty falling and staying asleep, sleeps 3-4 hours/night, sleeps in various positions  PLOF & Social Hx/Interests: pt used a cane for 1-1.5 years prior to hip replacement due to hip pain. Pt used walker and then cane after hip replacement but discontinued several weeks after replacement. Limp evident since MARTINE. Pt was generally active and played with nieces and nephews. Pt performed yard work. How much have your symptoms interfered with daily activities? Extremely  Current level of function:  Walking independently for short distances only, difficulty with steps. Pt performs cooking and cleaning with frequent rest breaks and increased pain.      Patient Stated Goals: be more active- cook a full meal without sitting, play with 9-16 year old nieces/nephews (throw ball, et)    OBJECTIVE EXAMINATION     Functional Outcome Questionnaire: Oswestry Low Back Pain Disability Questionnaire: 30/50= 60% functional deficit   Observation:   Posture: forward head, rounded shoulders, forward flexed trunk, iliac crests/PSIS level  Swelling/Edema: none  Skin Integrity: normal   Palpation/joint mobility: tender to palpation across low back (belt line), mildly tender L PSIS    A/PROM Measures:   Lumbar AROM full with increased central low back pain with all movements  Hip AROM:  Hip IR R

## 2023-06-08 RX ORDER — DICLOFENAC SODIUM 75 MG/1
TABLET, DELAYED RELEASE ORAL
Qty: 60 TABLET | Refills: 0 | OUTPATIENT
Start: 2023-06-08

## 2023-06-09 ENCOUNTER — TREATMENT (OUTPATIENT)
Age: 58
End: 2023-06-09

## 2023-06-09 DIAGNOSIS — M62.81 MUSCLE WEAKNESS: ICD-10-CM

## 2023-06-09 DIAGNOSIS — G89.29 CHRONIC BILATERAL LOW BACK PAIN WITHOUT SCIATICA: Primary | ICD-10-CM

## 2023-06-09 DIAGNOSIS — R26.9 ABNORMALITY OF GAIT: ICD-10-CM

## 2023-06-09 DIAGNOSIS — M54.50 CHRONIC BILATERAL LOW BACK PAIN WITHOUT SCIATICA: Primary | ICD-10-CM

## 2023-06-09 NOTE — PROGRESS NOTES
[facility]  [address] [place] [id number]  Dept: [phone]      Physical Therapy Daily Note     Referring MD: No ref. provider found  Diagnosis:     ICD-10-CM    1. Chronic bilateral low back pain without sciatica  M54.50     G89.29       2. Muscle weakness  M62.81       3. Abnormality of gait  R26.9            Therapy precautions:None  Co-morbidities affecting plan of care: s/p L MARTINE 4/5/22, hypertension, OA  Chief complaints/history of injury: Pt underwent L MARTINE 4/5/2022 and as pt recovered she noted new onset L sided low back pain. Pain has not changed since that time. Pt had home health PT after surgery but did not address back pain. Current symptoms: Pain mainly L side of low back at belt line. Pain radiates across back when severe. No radiating pain into buttock/LE. L leg does go numb at times. Pt with no incontinence but does have some constipation. Pt has had 1-2 falls in the past year due to leg giving out with pain. Patient Stated Goals: be more active- cook a full meal without sitting, play with 9-16 year old nieces/nephews (throw ball, etc)    Total Timed Procedure Codes: 40 min, Total Time: 40 min Modifier needed: No  Episode visit count:  2     SUBJECTIVE     Pt reports that she has been able to trial the home program with min difficulty. OBJECTIVE     Treatment provided today:  Therapeutic exercise (24751) x 30 min to develop ROM, strength, endurance and flexibility trunk and LLE.   Nustep level 3 x 5 min  Hooklying abdominal brace H5sec/relax for 2 min  Hooklying bent knee fall outs 2x10  Hooklying hip adductor squeeze h5sec/relax for 2 min  Hooklying bridge 2x10  Supine L hip abduction/adduction on sliding board 3x10   Sidelying L clamshell 3x5- partial range  Standing L hip abduction 2x10  Standing L hip extension 2x10  Sit-stand from weight bench 2x10 w/ glute squeeze at top  Manual therapy (19130) x 10 min utilizing techniques to improve joint

## 2023-06-20 NOTE — PROGRESS NOTES
[facility]  [address] [place] [id number]  Dept: [phone]      Physical Therapy Daily Note     Referring MD Kraig Santillan MD  Diagnosis:     ICD-10-CM    1. Chronic bilateral low back pain without sciatica  M54.50     G89.29       2. Muscle weakness  M62.81       3. Abnormality of gait  R26.9       4. Chronic left-sided low back pain without sciatica [M54.50, G89.29 (ICD-10-CM)]  M54.50     G89.29            Therapy precautions:None  Co-morbidities affecting plan of care: s/p L MARTINE 4/5/22, hypertension, OA  Chief complaints/history of injury: Pt underwent L MARTINE 4/5/2022 and as pt recovered she noted new onset L sided low back pain. Pain has not changed since that time. Pt had home health PT after surgery but did not address back pain. Current symptoms: Pain mainly L side of low back at belt line. Pain radiates across back when severe. No radiating pain into buttock/LE. L leg does go numb at times. Pt with no incontinence but does have some constipation. Pt has had 1-2 falls in the past year due to leg giving out with pain. Patient Stated Goals: be more active- cook a full meal without sitting, play with 9-16 year old nieces/nephews (throw ball, etc)    Total Timed Procedure Codes: 45 min, Total Time: 45 min Modifier needed: No  Episode visit count:  5/20 through 8/5/23     SUBJECTIVE   Pt reports that she was on her feet all day Saturday and was very sore on Sunday. Soreness was improved Monday/Tuesday. OBJECTIVE   Findings: Pt ambulates with lateral trunk deviation over the L hip, with shortened stride length  She has mod/severe tightness and tenderness over L glut medius, and mod tightness and tenderness over L piriformis. Treatment provided today:  Therapeutic exercise (08426) x 30 min to develop ROM, strength, endurance and flexibility trunk and LLE.   Nustep level 3 x 5 min  Hooklying abdominal brace w/ bent knee fall outs x 2 min  Hooklying

## 2023-06-21 ENCOUNTER — TREATMENT (OUTPATIENT)
Age: 58
End: 2023-06-21

## 2023-06-21 DIAGNOSIS — G89.29 CHRONIC BILATERAL LOW BACK PAIN WITHOUT SCIATICA: Primary | ICD-10-CM

## 2023-06-21 DIAGNOSIS — M62.81 MUSCLE WEAKNESS: ICD-10-CM

## 2023-06-21 DIAGNOSIS — R26.9 ABNORMALITY OF GAIT: ICD-10-CM

## 2023-06-21 DIAGNOSIS — M54.50 CHRONIC BILATERAL LOW BACK PAIN WITHOUT SCIATICA: Primary | ICD-10-CM

## 2023-06-21 DIAGNOSIS — G89.29 CHRONIC LEFT-SIDED LOW BACK PAIN WITHOUT SCIATICA: ICD-10-CM

## 2023-06-21 DIAGNOSIS — M54.50 CHRONIC LEFT-SIDED LOW BACK PAIN WITHOUT SCIATICA: ICD-10-CM

## 2023-06-23 ENCOUNTER — TREATMENT (OUTPATIENT)
Age: 58
End: 2023-06-23

## 2023-06-23 DIAGNOSIS — M62.81 MUSCLE WEAKNESS: ICD-10-CM

## 2023-06-23 DIAGNOSIS — M54.50 CHRONIC LEFT-SIDED LOW BACK PAIN WITHOUT SCIATICA: ICD-10-CM

## 2023-06-23 DIAGNOSIS — M54.50 CHRONIC BILATERAL LOW BACK PAIN WITHOUT SCIATICA: Primary | ICD-10-CM

## 2023-06-23 DIAGNOSIS — R26.9 ABNORMALITY OF GAIT: ICD-10-CM

## 2023-06-23 DIAGNOSIS — G89.29 CHRONIC BILATERAL LOW BACK PAIN WITHOUT SCIATICA: Primary | ICD-10-CM

## 2023-06-23 DIAGNOSIS — G89.29 CHRONIC LEFT-SIDED LOW BACK PAIN WITHOUT SCIATICA: ICD-10-CM

## 2023-06-23 NOTE — PROGRESS NOTES
abduction/adduction on sliding board against teal R-loop at distal thigh 2x10   Sidelying L clamshell 2x5 through partial range  Standing heel raises with glut squeeze H3sec, 2x10   Standing hip hike 2x5 B  Issued red band and updated HEP    Manual therapy (32305) x 10 min utilizing techniques to improve joint and/or soft tissue mobility, ROM, and function as well as helping to decrease pain/spasms and swelling. Palpation and assessment of soft tissue, muscles, and landmarks   Long axis distraction through L LE with stretching into abd  STM L glut medius and piriformis in R SDLY with pillow between knees using the stick    ASSESSMENT     Pt is progressing well and tolerated addition of resistance to hooklying marching and to supine hip abduction. She is able to complete an independent sidelying clamshell through a partial range on L. PLAN     Progress as tolerated- trial side steps with/without resistance    GOALS     Short term goals to be met by 7/5/2023  (4 weeks):  Patient will demonstrate good recall of HEP requiring no more than minimal verbal cuing for proper form and technique. Pt will increase L hip abduction strength to 2+/5 for improved stability during gait. Pt will report ability to sleep x 4-5 hours without waking due back pain. Pt will improve score on the Oswestry Low Back Pain Questionnaire to </= 50 % disability, demonstrating improved overall function. Long term goals to be met by 9/5/2023  (12 weeks):  Patient will be compliant and independent with a comprehensive HEP and activity progression. Pt will increase hip strength to at least 4/5 flexion, 4/5 abduction, 4/5 extension for improved stability during gait. Pt will increase lower abdominal strength to at least  4/5 for improved spinal stabilization with functional activity. Pt will stand long enough to cook 1 meal without increased back pain.   Pt will improve score on the Oswestry Low Back Pain Questionnaire to </= 30 %

## 2023-06-27 ENCOUNTER — TREATMENT (OUTPATIENT)
Age: 58
End: 2023-06-27
Payer: MEDICAID

## 2023-06-27 DIAGNOSIS — R26.9 ABNORMALITY OF GAIT: ICD-10-CM

## 2023-06-27 DIAGNOSIS — G89.29 CHRONIC BILATERAL LOW BACK PAIN WITHOUT SCIATICA: Primary | ICD-10-CM

## 2023-06-27 DIAGNOSIS — M62.81 MUSCLE WEAKNESS: ICD-10-CM

## 2023-06-27 DIAGNOSIS — M54.50 CHRONIC BILATERAL LOW BACK PAIN WITHOUT SCIATICA: Primary | ICD-10-CM

## 2023-06-27 DIAGNOSIS — M54.50 CHRONIC LEFT-SIDED LOW BACK PAIN WITHOUT SCIATICA: ICD-10-CM

## 2023-06-27 DIAGNOSIS — G89.29 CHRONIC LEFT-SIDED LOW BACK PAIN WITHOUT SCIATICA: ICD-10-CM

## 2023-06-27 PROCEDURE — 97110 THERAPEUTIC EXERCISES: CPT | Performed by: PHYSICAL THERAPIST

## 2023-06-27 PROCEDURE — 97140 MANUAL THERAPY 1/> REGIONS: CPT | Performed by: PHYSICAL THERAPIST

## 2023-06-30 ENCOUNTER — TREATMENT (OUTPATIENT)
Age: 58
End: 2023-06-30

## 2023-06-30 DIAGNOSIS — M62.81 MUSCLE WEAKNESS: ICD-10-CM

## 2023-06-30 DIAGNOSIS — G89.29 CHRONIC BILATERAL LOW BACK PAIN WITHOUT SCIATICA: Primary | ICD-10-CM

## 2023-06-30 DIAGNOSIS — G89.29 CHRONIC LEFT-SIDED LOW BACK PAIN WITHOUT SCIATICA: ICD-10-CM

## 2023-06-30 DIAGNOSIS — M54.50 CHRONIC LEFT-SIDED LOW BACK PAIN WITHOUT SCIATICA: ICD-10-CM

## 2023-06-30 DIAGNOSIS — R26.9 ABNORMALITY OF GAIT: ICD-10-CM

## 2023-06-30 DIAGNOSIS — M54.50 CHRONIC BILATERAL LOW BACK PAIN WITHOUT SCIATICA: Primary | ICD-10-CM

## 2023-07-06 ENCOUNTER — TREATMENT (OUTPATIENT)
Age: 58
End: 2023-07-06

## 2023-07-06 DIAGNOSIS — M54.50 CHRONIC BILATERAL LOW BACK PAIN WITHOUT SCIATICA: Primary | ICD-10-CM

## 2023-07-06 DIAGNOSIS — M54.50 CHRONIC LEFT-SIDED LOW BACK PAIN WITHOUT SCIATICA: ICD-10-CM

## 2023-07-06 DIAGNOSIS — M62.81 MUSCLE WEAKNESS: ICD-10-CM

## 2023-07-06 DIAGNOSIS — G89.29 CHRONIC LEFT-SIDED LOW BACK PAIN WITHOUT SCIATICA: ICD-10-CM

## 2023-07-06 DIAGNOSIS — G89.29 CHRONIC BILATERAL LOW BACK PAIN WITHOUT SCIATICA: Primary | ICD-10-CM

## 2023-07-06 DIAGNOSIS — R26.9 ABNORMALITY OF GAIT: ICD-10-CM

## 2023-07-06 NOTE — PROGRESS NOTES
H5sec/relax 2x1 minHooklying lumbar rotation x 10  Supine SLR 2x5 (independent R, AA L)    Manual therapy (13838) x 5 min utilizing techniques to improve joint and/or soft tissue mobility, ROM, and function as well as helping to decrease pain/spasms and swelling.- Pt unable to tolerate manual today due to muscle soreness  Palpation and assessment of soft tissue, muscles, and landmarks   Long axis distraction through L LE with stretching into abd  STM L glut medius and piriformis in R SDLY  ASSESSMENT     Pt able to resume exercises this session. Decreased resistance and reps from previous sessions and will gradually increase. Pt able to stabilize pelvis during standing hip abduction. PLAN     Progress note- held today due to late arrival    GOALS     Short term goals to be met by 7/5/2023  (4 weeks):  Patient will demonstrate good recall of HEP requiring no more than minimal verbal cuing for proper form and technique. Pt will increase L hip abduction strength to 2+/5 for improved stability during gait. Pt will report ability to sleep x 4-5 hours without waking due back pain. Pt will improve score on the Oswestry Low Back Pain Questionnaire to </= 50 % disability, demonstrating improved overall function. Long term goals to be met by 9/5/2023  (12 weeks):  Patient will be compliant and independent with a comprehensive HEP and activity progression. Pt will increase hip strength to at least 4/5 flexion, 4/5 abduction, 4/5 extension for improved stability during gait. Pt will increase lower abdominal strength to at least  4/5 for improved spinal stabilization with functional activity. Pt will stand long enough to cook 1 meal without increased back pain. Pt will improve score on the Oswestry Low Back Pain Questionnaire to </= 30 % disability, demonstrating improved overall function. [place]  Access Code: [email or web address]  URL: [email or web address]  Date: 07/06/2023  Prepared by: Cory Fox

## 2023-07-07 ENCOUNTER — TREATMENT (OUTPATIENT)
Age: 58
End: 2023-07-07
Payer: MEDICAID

## 2023-07-07 DIAGNOSIS — M62.81 MUSCLE WEAKNESS: ICD-10-CM

## 2023-07-07 DIAGNOSIS — G89.29 CHRONIC BILATERAL LOW BACK PAIN WITHOUT SCIATICA: Primary | ICD-10-CM

## 2023-07-07 DIAGNOSIS — M54.50 CHRONIC BILATERAL LOW BACK PAIN WITHOUT SCIATICA: Primary | ICD-10-CM

## 2023-07-07 DIAGNOSIS — G89.29 CHRONIC LEFT-SIDED LOW BACK PAIN WITHOUT SCIATICA: ICD-10-CM

## 2023-07-07 DIAGNOSIS — R26.9 ABNORMALITY OF GAIT: ICD-10-CM

## 2023-07-07 DIAGNOSIS — M54.50 CHRONIC LEFT-SIDED LOW BACK PAIN WITHOUT SCIATICA: ICD-10-CM

## 2023-07-07 PROCEDURE — 97110 THERAPEUTIC EXERCISES: CPT | Performed by: PHYSICAL THERAPIST

## 2023-07-10 ENCOUNTER — TREATMENT (OUTPATIENT)
Age: 58
End: 2023-07-10
Payer: MEDICAID

## 2023-07-10 DIAGNOSIS — M62.81 MUSCLE WEAKNESS: ICD-10-CM

## 2023-07-10 DIAGNOSIS — G89.29 CHRONIC BILATERAL LOW BACK PAIN WITHOUT SCIATICA: Primary | ICD-10-CM

## 2023-07-10 DIAGNOSIS — M54.50 CHRONIC BILATERAL LOW BACK PAIN WITHOUT SCIATICA: Primary | ICD-10-CM

## 2023-07-10 DIAGNOSIS — M54.50 CHRONIC LEFT-SIDED LOW BACK PAIN WITHOUT SCIATICA: ICD-10-CM

## 2023-07-10 DIAGNOSIS — R26.9 ABNORMALITY OF GAIT: ICD-10-CM

## 2023-07-10 DIAGNOSIS — G89.29 CHRONIC LEFT-SIDED LOW BACK PAIN WITHOUT SCIATICA: ICD-10-CM

## 2023-07-10 DIAGNOSIS — Z96.642 PRESENCE OF LEFT ARTIFICIAL HIP JOINT: ICD-10-CM

## 2023-07-10 PROCEDURE — 97110 THERAPEUTIC EXERCISES: CPT | Performed by: PHYSICAL THERAPIST

## 2023-07-10 NOTE — PROGRESS NOTES
first set and active remaining, 2x10 independent R  Supine marching against green band 2 x 1 min  Sidelying L hip abduction 2x5  Sidelying L clamshell 3x5 full range  Hooklying clamshell against green band 2x1 min  Hooklying bridge + hip adductor squeeze x1 min  Hooklying bridge + isometric abduction against green band x 1 min  Hip and knee flex/ext using small theraball and performing end range quad set x 1 min  Sit-stand from weight bench 2x10 w/ glute squeeze at top    Manual therapy (96254) x 5 min utilizing techniques to improve joint and/or soft tissue mobility, ROM, and function as well as helping to decrease pain/spasms and swelling.- Pt unable to tolerate manual today due to muscle soreness  Palpation and assessment of soft tissue, muscles, and landmarks   Long axis distraction through L LE with stretching into abd  STM L glut medius and piriformis in R SDLY  ASSESSMENT     Pt with marked improvement in L hip strength today and was able to perform a supine SLR and sidelying hip abduction/clamshell through full ranges. She has now met short term goal 1. Recommend pt have a rest day tomorrow in preparation for session Wednesday. PLAN     Continue with focus on improving core and LE strength for improved gait/transfers, update home program    GOALS     Short term goals to be met by 8/5/2023  (4 weeks):  Pt will increase L hip abduction strength to 3-/5 for improved stability during gait. Goal Met 7/10/2023   Pt will increase L hip extension strength to 3-/5 for improved stability during gait. Pt will improve score on the Oswestry Low Back Pain Questionnaire to </= 50 % disability, demonstrating improved overall function. Long term goals to be met by 9/5/2023  (12 weeks):  Patient will be compliant and independent with a comprehensive HEP and activity progression. Pt will increase hip strength to at least 4/5 flexion, 4/5 abduction, 4/5 extension for improved stability during gait.   Pt will increase

## 2023-07-12 ENCOUNTER — TREATMENT (OUTPATIENT)
Age: 58
End: 2023-07-12

## 2023-07-12 DIAGNOSIS — M54.50 CHRONIC LEFT-SIDED LOW BACK PAIN WITHOUT SCIATICA: ICD-10-CM

## 2023-07-12 DIAGNOSIS — G89.29 CHRONIC LEFT-SIDED LOW BACK PAIN WITHOUT SCIATICA: ICD-10-CM

## 2023-07-12 DIAGNOSIS — M62.81 MUSCLE WEAKNESS: ICD-10-CM

## 2023-07-12 DIAGNOSIS — R26.9 ABNORMALITY OF GAIT: ICD-10-CM

## 2023-07-12 DIAGNOSIS — M54.50 CHRONIC BILATERAL LOW BACK PAIN WITHOUT SCIATICA: Primary | ICD-10-CM

## 2023-07-12 DIAGNOSIS — Z96.642 PRESENCE OF LEFT ARTIFICIAL HIP JOINT: ICD-10-CM

## 2023-07-12 DIAGNOSIS — G89.29 CHRONIC BILATERAL LOW BACK PAIN WITHOUT SCIATICA: Primary | ICD-10-CM

## 2023-07-12 NOTE — PROGRESS NOTES
[facility]  [address] [place] [id number]  Dept: [phone]      Physical Therapy Daily Note     Referring MD Peyton Nevarez MD  Diagnosis:     ICD-10-CM    1. Chronic bilateral low back pain without sciatica  M54.50     G89.29       2. Abnormality of gait  R26.9       3. Muscle weakness  M62.81       4. Chronic left-sided low back pain without sciatica [M54.50, G89.29 (ICD-10-CM)]  M54.50     G89.29       5. Presence of left artificial hip joint [T95.468 (ICD-10-CM)]  [id number]         Therapy precautions:None  Co-morbidities affecting plan of care: s/p L MARTINE 4/5/22, hypertension, OA  Chief complaints/history of injury: Pt underwent L MARTINE 4/5/2022 and as pt recovered she noted new onset L sided low back pain. Pain has not changed since that time. Pt had home health PT after surgery but did not address back pain. Current symptoms: Pain mainly L side of low back at belt line. Pain radiates across back when severe. No radiating pain into buttock/LE. L leg does go numb at times. Pt with no incontinence but does have some constipation. Pt has had 1-2 falls in the past year due to leg giving out with pain. Patient Stated Goals: be more active- cook a full meal without sitting, play with 8-13 year old nieces/nephews (throw ball, etc)    Total Timed Procedure Codes: 45 min, Total Time: 45 min Modifier needed: No  Episode visit count:  12/20 through 8/5/23     SUBJECTIVE   Pt reports that \"I feel a whole lot better than when I started. \"    OBJECTIVE     Treatment provided today:  Therapeutic exercise (98515) x 36 min to develop ROM, strength, endurance and flexibility trunk and LLE.   Nustep level 4 x 5 min  Standing heel raises with glut squeeze H3sec, 2x15   Prone over table hip extension 2x10 BStanding hip hike 2x10 B  Supine marching against green band 2 x 1 min  Sidelying L hip abduction 3x5  Sidelying L clamshell 3x8 full range  Hooklying bridge + hip adductor

## 2023-07-13 ENCOUNTER — TELEPHONE (OUTPATIENT)
Dept: INTERNAL MEDICINE CLINIC | Facility: CLINIC | Age: 58
End: 2023-07-13

## 2023-07-13 DIAGNOSIS — E78.2 MIXED HYPERLIPIDEMIA: ICD-10-CM

## 2023-07-13 DIAGNOSIS — I10 PRIMARY HYPERTENSION: ICD-10-CM

## 2023-07-13 RX ORDER — ATORVASTATIN CALCIUM 10 MG/1
10 TABLET, FILM COATED ORAL DAILY
Qty: 30 TABLET | Refills: 2 | Status: SHIPPED | OUTPATIENT
Start: 2023-07-13

## 2023-07-13 RX ORDER — HYDROCHLOROTHIAZIDE 25 MG/1
25 TABLET ORAL DAILY
Qty: 30 TABLET | Refills: 2 | Status: SHIPPED | OUTPATIENT
Start: 2023-07-13

## 2023-07-13 NOTE — TELEPHONE ENCOUNTER
Needs refill on    atorvastatin (LIPITOR) 10 MG tablet   And    hydroCHLOROthiazide (HYDRODIURIL) 25 MG tablet     Send to   [facility]/pharmacy #[id number] [facility], [address] - P [phone] - F [phone]

## 2023-07-18 NOTE — PROGRESS NOTES
[facility] Renzo Eldridge  [address] [place] [id number]  Dept: [phone]      Physical Therapy Daily Note     Referring MD Debbi Espinoza MD  Diagnosis:     ICD-10-CM    1. Chronic bilateral low back pain without sciatica  M54.50     G89.29       2. Abnormality of gait  R26.9       3. Muscle weakness  M62.81       4. Chronic left-sided low back pain without sciatica [M54.50, G89.29 (ICD-10-CM)]  M54.50     G89.29         Therapy precautions:None  Co-morbidities affecting plan of care: s/p L MARTINE 4/5/22, hypertension, OA  Chief complaints/history of injury: Pt underwent L MARTINE 4/5/2022 and as pt recovered she noted new onset L sided low back pain. Pain has not changed since that time. Pt had home health PT after surgery but did not address back pain. Current symptoms: Pain mainly L side of low back at belt line. Pain radiates across back when severe. No radiating pain into buttock/LE. L leg does go numb at times. Pt with no incontinence but does have some constipation. Pt has had 1-2 falls in the past year due to leg giving out with pain. Patient Stated Goals: be more active- cook a full meal without sitting, play with 8-13 year old nieces/nephews (throw ball, etc)    Total Timed Procedure Codes: 45 min, Total Time: 45 min Modifier needed: No  Episode visit count:  13/20 through 8/5/23     SUBJECTIVE   Pt reports that she continues to do well with regards to back/hip. OBJECTIVE     Treatment provided today:  Therapeutic exercise (97320) x 35 min to develop ROM, strength, endurance and flexibility trunk and LLE.   Nustep level 4 x 5 min  Standing heel raises with glut squeeze H3sec, 2x15   Prone over table hip extension w/ bent knee against light booty band 2x10 BStanding hip abduction against light booty band x 10 BStanding hip hike 2x10 B  Sit-stand from low plinth 2x10 w/ light booty band at distal thighsSupine marching against light booty band 2 x 1 minSupine SLR 2x5 L,

## 2023-07-19 ENCOUNTER — TREATMENT (OUTPATIENT)
Age: 58
End: 2023-07-19
Payer: MEDICAID

## 2023-07-19 DIAGNOSIS — R26.9 ABNORMALITY OF GAIT: ICD-10-CM

## 2023-07-19 DIAGNOSIS — G89.29 CHRONIC LEFT-SIDED LOW BACK PAIN WITHOUT SCIATICA: ICD-10-CM

## 2023-07-19 DIAGNOSIS — G89.29 CHRONIC BILATERAL LOW BACK PAIN WITHOUT SCIATICA: Primary | ICD-10-CM

## 2023-07-19 DIAGNOSIS — M54.50 CHRONIC LEFT-SIDED LOW BACK PAIN WITHOUT SCIATICA: ICD-10-CM

## 2023-07-19 DIAGNOSIS — M62.81 MUSCLE WEAKNESS: ICD-10-CM

## 2023-07-19 DIAGNOSIS — M54.50 CHRONIC BILATERAL LOW BACK PAIN WITHOUT SCIATICA: Primary | ICD-10-CM

## 2023-07-19 PROCEDURE — 97112 NEUROMUSCULAR REEDUCATION: CPT | Performed by: PHYSICAL THERAPIST

## 2023-07-19 PROCEDURE — 97110 THERAPEUTIC EXERCISES: CPT | Performed by: PHYSICAL THERAPIST

## 2023-07-21 ENCOUNTER — TELEPHONE (OUTPATIENT)
Age: 58
End: 2023-07-21

## 2023-07-21 NOTE — TELEPHONE ENCOUNTER
Pt did not show for their scheduled therapy appointment today. Reason: unknown  Communication: left voicemail requesting that patient return call due to missed appt.

## 2023-07-26 ENCOUNTER — TELEPHONE (OUTPATIENT)
Age: 58
End: 2023-07-26

## 2023-08-01 NOTE — PROGRESS NOTES
[facility] INT Don Pedraza  [address] [facility] [address]  Dept: [phone]      Physical Therapy Daily Note     Referring MD Colen Habermann MD  Diagnosis:     ICD-10-CM    1. Chronic bilateral low back pain without sciatica  M54.50     G89.29       2. Abnormality of gait  R26.9       3. Muscle weakness  M62.81       4. Chronic left-sided low back pain without sciatica [M54.50, G89.29 (ICD-10-CM)]  M54.50     G89.29       5. Presence of left artificial hip joint [B41.379 (ICD-10-CM)]  [id number]         Therapy precautions:None  Co-morbidities affecting plan of care: s/p L MARTINE 4/5/22, hypertension, OA  Chief complaints/history of injury: Pt underwent L MARTINE 4/5/2022 and as pt recovered she noted new onset L sided low back pain. Pain has not changed since that time. Pt had home health PT after surgery but did not address back pain. Current symptoms: Pain mainly L side of low back at belt line. Pain radiates across back when severe. No radiating pain into buttock/LE. L leg does go numb at times. Pt with no incontinence but does have some constipation. Pt has had 1-2 falls in the past year due to leg giving out with pain. Patient Stated Goals: be more active- cook a full meal without sitting, play with 8-13 year old nieces/nephews (throw ball, etc)    Total Timed Procedure Codes: 40 min, Total Time: 40 min Modifier needed: No  Episode visit count:  14/20 through 9/4/23     SUBJECTIVE   Pt reports that she missed last 3 appointments due to food poisoning/illness. She was sick for a full week and lost 5 pounds. She attempted to text therapist (no texting available on office phone). Voicemail was left on therapist line 7/26/23 for cancellation (therapist was out of town). OBJECTIVE   Pt instructed to call with cancellations as phone lines are not compatible with texting.     Treatment provided today:  Therapeutic exercise (48625) x 40 min to develop ROM, strength, endurance and

## 2023-08-02 ENCOUNTER — TREATMENT (OUTPATIENT)
Age: 58
End: 2023-08-02
Payer: MEDICAID

## 2023-08-02 DIAGNOSIS — G89.29 CHRONIC LEFT-SIDED LOW BACK PAIN WITHOUT SCIATICA: ICD-10-CM

## 2023-08-02 DIAGNOSIS — M54.50 CHRONIC BILATERAL LOW BACK PAIN WITHOUT SCIATICA: Primary | ICD-10-CM

## 2023-08-02 DIAGNOSIS — G89.29 CHRONIC BILATERAL LOW BACK PAIN WITHOUT SCIATICA: Primary | ICD-10-CM

## 2023-08-02 DIAGNOSIS — M62.81 MUSCLE WEAKNESS: ICD-10-CM

## 2023-08-02 DIAGNOSIS — M54.50 CHRONIC LEFT-SIDED LOW BACK PAIN WITHOUT SCIATICA: ICD-10-CM

## 2023-08-02 DIAGNOSIS — R26.9 ABNORMALITY OF GAIT: ICD-10-CM

## 2023-08-02 DIAGNOSIS — Z96.642 PRESENCE OF LEFT ARTIFICIAL HIP JOINT: ICD-10-CM

## 2023-08-02 PROCEDURE — 97110 THERAPEUTIC EXERCISES: CPT | Performed by: PHYSICAL THERAPIST

## 2023-08-04 ENCOUNTER — TREATMENT (OUTPATIENT)
Age: 58
End: 2023-08-04

## 2023-08-04 DIAGNOSIS — M62.81 MUSCLE WEAKNESS: ICD-10-CM

## 2023-08-04 DIAGNOSIS — M54.50 CHRONIC BILATERAL LOW BACK PAIN WITHOUT SCIATICA: Primary | ICD-10-CM

## 2023-08-04 DIAGNOSIS — G89.29 CHRONIC BILATERAL LOW BACK PAIN WITHOUT SCIATICA: Primary | ICD-10-CM

## 2023-08-04 DIAGNOSIS — R26.9 ABNORMALITY OF GAIT: ICD-10-CM

## 2023-08-04 DIAGNOSIS — G89.29 CHRONIC LEFT-SIDED LOW BACK PAIN WITHOUT SCIATICA: ICD-10-CM

## 2023-08-04 DIAGNOSIS — M54.50 CHRONIC LEFT-SIDED LOW BACK PAIN WITHOUT SCIATICA: ICD-10-CM

## 2023-08-04 DIAGNOSIS — Z96.642 PRESENCE OF LEFT ARTIFICIAL HIP JOINT: ICD-10-CM

## 2023-08-04 NOTE — PROGRESS NOTES
[facility]  [address] [place] [address]  Dept: [phone]      Physical Therapy Progress Report     Referring MD Aurelio Dockery MD  Diagnosis:     ICD-10-CM    1. Chronic bilateral low back pain without sciatica  M54.50     G89.29       2. Abnormality of gait  R26.9       3. Muscle weakness  M62.81       4. Chronic left-sided low back pain without sciatica [M54.50, G89.29 (ICD-10-CM)]  M54.50     G89.29       5. Presence of left artificial hip joint [Q73.609 (ICD-10-CM)]  [id number]           Therapy precautions:None  Co-morbidities affecting plan of care: s/p L MARTINE 4/5/22, hypertension, OA  Chief complaints/history of injury: Pt underwent L MARTINE 4/5/2022 and as pt recovered she noted new onset L sided low back pain. Pain has not changed since that time. Pt had home health PT after surgery but did not address back pain. Current symptoms: Pain mainly L side of low back at belt line. Pain radiates across back when severe. No radiating pain into buttock/LE. L leg does go numb at times. Pt with no incontinence but does have some constipation. Pt has had 1-2 falls in the past year due to leg giving out with pain. Patient Stated Goals: be more active- cook a full meal without sitting, play with 8-13 year old nieces/nephews (throw ball, etc)    Total Timed Procedure Codes: 45 min, Total Time: 45 min Modifier needed: No  Episode visit count:  15/20 through 9/4/23     SUBJECTIVE   Nature of condition: Chronic (continuous duration > 3 months)  Describe current symptoms: Pt reports continued central low back pain that is improving. She notes increased standing and walking tolerance. Pt reports that she still tends to take a seated rest when preparing a meal.     Pain Assessment:  Pain location: central LBP    Average Pain/symptom intensity (0-10 scale)  Last 24 hours: 7/10  Last week (1-7 days): 7/10  How often do you feel symptoms?  Occasionally (26-50%)    How much have

## 2023-08-08 ENCOUNTER — TREATMENT (OUTPATIENT)
Age: 58
End: 2023-08-08
Payer: MEDICAID

## 2023-08-08 DIAGNOSIS — M54.50 CHRONIC LEFT-SIDED LOW BACK PAIN WITHOUT SCIATICA: ICD-10-CM

## 2023-08-08 DIAGNOSIS — G89.29 CHRONIC LEFT-SIDED LOW BACK PAIN WITHOUT SCIATICA: ICD-10-CM

## 2023-08-08 DIAGNOSIS — G89.29 CHRONIC BILATERAL LOW BACK PAIN WITHOUT SCIATICA: Primary | ICD-10-CM

## 2023-08-08 DIAGNOSIS — R26.9 ABNORMALITY OF GAIT: ICD-10-CM

## 2023-08-08 DIAGNOSIS — M62.81 MUSCLE WEAKNESS: ICD-10-CM

## 2023-08-08 DIAGNOSIS — M54.50 CHRONIC BILATERAL LOW BACK PAIN WITHOUT SCIATICA: Primary | ICD-10-CM

## 2023-08-08 PROCEDURE — 97110 THERAPEUTIC EXERCISES: CPT | Performed by: PHYSICAL THERAPIST

## 2023-08-08 NOTE — PROGRESS NOTES
[facility] INT [address] [address]  Dept: [phone]      Physical Therapy Progress Report     Referring MD Quitman Lundborg MD  Diagnosis:     ICD-10-CM    1. Chronic bilateral low back pain without sciatica  M54.50     G89.29       2. Abnormality of gait  R26.9       3. Muscle weakness  M62.81       4. Chronic left-sided low back pain without sciatica [M54.50, G89.29 (ICD-10-CM)]  M54.50     G89.29           Therapy precautions:None  Co-morbidities affecting plan of care: s/p L MARTINE 4/5/22, hypertension, OA  Chief complaints/history of injury: Pt underwent L MARTINE 4/5/2022 and as pt recovered she noted new onset L sided low back pain. Pain has not changed since that time. Pt had home health PT after surgery but did not address back pain. Current symptoms: Pain mainly L side of low back at belt line. Pain radiates across back when severe. No radiating pain into buttock/LE. L leg does go numb at times. Pt with no incontinence but does have some constipation. Pt has had 1-2 falls in the past year due to leg giving out with pain. Patient Stated Goals: be more active- cook a full meal without sitting, play with 8-13 year old nieces/nephews (throw ball, etc)    Total Timed Procedure Codes: 45 min, Total Time: 45 min Modifier needed: No  Episode visit count:  16/20 through 9/4/23     SUBJECTIVE   Nature of condition: Chronic (continuous duration > 3 months)  Describe current symptoms: Pt reports continued central low back pain that is improving. She notes increased standing and walking tolerance. Pt reports that she still tends to take a seated rest when preparing a meal.     Pain Assessment:  Pain location: central LBP    Average Pain/symptom intensity (0-10 scale)  Last 24 hours: 7/10  Last week (1-7 days): 7/10  How often do you feel symptoms? Occasionally (26-50%)    How much have your symptoms interfered with daily activities?  Moderately  How has your

## 2023-08-11 ENCOUNTER — TREATMENT (OUTPATIENT)
Age: 58
End: 2023-08-11

## 2023-08-11 DIAGNOSIS — M62.81 MUSCLE WEAKNESS: ICD-10-CM

## 2023-08-11 DIAGNOSIS — M54.50 CHRONIC LEFT-SIDED LOW BACK PAIN WITHOUT SCIATICA: ICD-10-CM

## 2023-08-11 DIAGNOSIS — R26.9 ABNORMALITY OF GAIT: ICD-10-CM

## 2023-08-11 DIAGNOSIS — G89.29 CHRONIC BILATERAL LOW BACK PAIN WITHOUT SCIATICA: Primary | ICD-10-CM

## 2023-08-11 DIAGNOSIS — Z96.642 PRESENCE OF LEFT ARTIFICIAL HIP JOINT: ICD-10-CM

## 2023-08-11 DIAGNOSIS — M54.50 CHRONIC BILATERAL LOW BACK PAIN WITHOUT SCIATICA: Primary | ICD-10-CM

## 2023-08-11 DIAGNOSIS — G89.29 CHRONIC LEFT-SIDED LOW BACK PAIN WITHOUT SCIATICA: ICD-10-CM

## 2023-08-11 NOTE — PROGRESS NOTES
[facility] INT Daisy Orlando  [address] [facility] [address]  Dept: [phone]      Physical Therapy Daily Note     Referring MD Enrique Jones MD  Diagnosis:     ICD-10-CM    1. Chronic bilateral low back pain without sciatica  M54.50     G89.29       2. Abnormality of gait  R26.9       3. Muscle weakness  M62.81       4. Chronic left-sided low back pain without sciatica [M54.50, G89.29 (ICD-10-CM)]  M54.50     G89.29       5. Presence of left artificial hip joint [L64.280 (ICD-10-CM)]  [id number]           Therapy precautions:None  Co-morbidities affecting plan of care: s/p L MARTINE 4/5/22, hypertension, OA  Chief complaints/history of injury: Pt underwent L MARTINE 4/5/2022 and as pt recovered she noted new onset L sided low back pain. Pain has not changed since that time. Pt had home health PT after surgery but did not address back pain. Current symptoms: Pain mainly L side of low back at belt line. Pain radiates across back when severe. No radiating pain into buttock/LE. L leg does go numb at times. Pt with no incontinence but does have some constipation. Pt has had 1-2 falls in the past year due to leg giving out with pain. Patient Stated Goals: be more active- cook a full meal without sitting, play with 8-13 year old nieces/nephews (throw ball, etc)    Total Timed Procedure Codes: 58 min, Total Time: 58 min Modifier needed: No  Episode visit count:  17/20 through 9/4/23     SUBJECTIVE   Pt reports \"I feel good today. \"     OBJECTIVE     Treatment provided today:  Therapeutic exercise (81375) x 48 min to develop ROM, strength, endurance and flexibility trunk and LLE.   Nustep level 5 x 5 min  Standing heel raises with glut squeeze H3sec, 2x15   Standing hip hike 2x15 BProne over table hip extension w/ bent knee against light booty band 3x10 BStanding hip abduction against light booty band 2x10 BSit-stand from low plinth 2x10 w/ light booty band at distal thighsSupine marching against

## 2023-08-16 ENCOUNTER — TREATMENT (OUTPATIENT)
Age: 58
End: 2023-08-16

## 2023-08-16 DIAGNOSIS — R26.9 ABNORMALITY OF GAIT: ICD-10-CM

## 2023-08-16 DIAGNOSIS — G89.29 CHRONIC LEFT-SIDED LOW BACK PAIN WITHOUT SCIATICA: ICD-10-CM

## 2023-08-16 DIAGNOSIS — M62.81 MUSCLE WEAKNESS: ICD-10-CM

## 2023-08-16 DIAGNOSIS — Z96.642 PRESENCE OF LEFT ARTIFICIAL HIP JOINT: ICD-10-CM

## 2023-08-16 DIAGNOSIS — M54.50 CHRONIC BILATERAL LOW BACK PAIN WITHOUT SCIATICA: Primary | ICD-10-CM

## 2023-08-16 DIAGNOSIS — G89.29 CHRONIC BILATERAL LOW BACK PAIN WITHOUT SCIATICA: Primary | ICD-10-CM

## 2023-08-16 DIAGNOSIS — M54.50 CHRONIC LEFT-SIDED LOW BACK PAIN WITHOUT SCIATICA: ICD-10-CM

## 2023-08-16 NOTE — PROGRESS NOTES
[facility] [address] [address]  Dept: [phone]      Physical Therapy Daily Note     Referring MD [place] Jose CURRIE  Diagnosis:     ICD-10-CM    1. Chronic bilateral low back pain without sciatica  M54.50     G89.29       2. Abnormality of gait  R26.9       3. Muscle weakness  M62.81       4. Chronic left-sided low back pain without sciatica [M54.50, G89.29 (ICD-10-CM)]  M54.50     G89.29       5. Presence of left artificial hip joint [E65.533 (ICD-10-CM)]  [id number]           Therapy precautions:None  Co-morbidities affecting plan of care: s/p L MARTINE 4/5/22, hypertension, OA  Chief complaints/history of injury: Pt underwent L MARTINE 4/5/2022 and as pt recovered she noted new onset L sided low back pain. Pain has not changed since that time. Pt had home health PT after surgery but did not address back pain. Current symptoms: Pain mainly L side of low back at belt line. Pain radiates across back when severe. No radiating pain into buttock/LE. L leg does go numb at times. Pt with no incontinence but does have some constipation. Pt has had 1-2 falls in the past year due to leg giving out with pain. Patient Stated Goals: be more active- cook a full meal without sitting, play with 8-13 year old nieces/nephews (throw ball, etc)    Total Timed Procedure Codes: 40 min, Total Time: 40 min Modifier needed: No  Episode visit count:  18/20 through 9/4/23     SUBJECTIVE   Pt reports that her L hip flexors are sore today. She has been exercising daily without rest days. Pt also watched niece and nephew Monday/Tuesday and was very active. OBJECTIVE     Treatment provided today:  Therapeutic exercise (89073) x 40 min to develop ROM, strength, endurance and flexibility trunk and LLE.   Nustep level 5 x 5 min  Standign heel raises with glut squeeze 2x15 on airex pad  Standing hip hike 2x15 BModified L keshia stretch H30sec x 3Isometric hip abduction at wall H5sec 2x5

## 2023-08-18 ENCOUNTER — TREATMENT (OUTPATIENT)
Age: 58
End: 2023-08-18

## 2023-08-18 DIAGNOSIS — M62.81 MUSCLE WEAKNESS: ICD-10-CM

## 2023-08-18 DIAGNOSIS — M54.50 CHRONIC LEFT-SIDED LOW BACK PAIN WITHOUT SCIATICA: ICD-10-CM

## 2023-08-18 DIAGNOSIS — G89.29 CHRONIC BILATERAL LOW BACK PAIN WITHOUT SCIATICA: Primary | ICD-10-CM

## 2023-08-18 DIAGNOSIS — G89.29 CHRONIC LEFT-SIDED LOW BACK PAIN WITHOUT SCIATICA: ICD-10-CM

## 2023-08-18 DIAGNOSIS — R26.9 ABNORMALITY OF GAIT: ICD-10-CM

## 2023-08-18 DIAGNOSIS — M54.50 CHRONIC BILATERAL LOW BACK PAIN WITHOUT SCIATICA: Primary | ICD-10-CM

## 2023-08-18 NOTE — PROGRESS NOTES
[facility] INT Linda Moncada  [address] [place] [address]  Dept: [phone]      Physical Therapy Daily Note     Referring MD Rubina Alejandro MD  Diagnosis:     ICD-10-CM    1. Chronic bilateral low back pain without sciatica  M54.50     G89.29       2. Abnormality of gait  R26.9       3. Muscle weakness  M62.81       4. Chronic left-sided low back pain without sciatica [M54.50, G89.29 (ICD-10-CM)]  M54.50     G89.29           Therapy precautions:None  Co-morbidities affecting plan of care: s/p L MARTINE 4/5/22, hypertension, OA  Chief complaints/history of injury: Pt underwent L MARTINE 4/5/2022 and as pt recovered she noted new onset L sided low back pain. Pain has not changed since that time. Pt had home health PT after surgery but did not address back pain. Current symptoms: Pain mainly L side of low back at belt line. Pain radiates across back when severe. No radiating pain into buttock/LE. L leg does go numb at times. Pt with no incontinence but does have some constipation. Pt has had 1-2 falls in the past year due to leg giving out with pain. Patient Stated Goals: be more active- cook a full meal without sitting, play with 8-13 year old nieces/nephews (throw ball, etc)    Total Timed Procedure Codes: 46 min, Total Time: 46 min Modifier needed: No  Episode visit count:  19/20 through 9/4/23     SUBJECTIVE   Pt reports that her L hip flexor is still a little sore but better than last session. OBJECTIVE     Treatment provided today:  Therapeutic exercise (44964) x 33 min to develop ROM, strength, endurance and flexibility trunk and LLE.   Nustep level 6 x 5 min  Standing heel raises with glut squeeze 2x15 on airex pad  Standing hip hike 2x15 BIsometric hip abduction at wall H5sec 2x5 B6-inch lateral step downs x8 BModified L keshia stretch H30sec x 3  Sidelying clamshell 2x10 L  Isometric bridge H30sec x 3  Manual therapy (61662) x 5 min utilizing techniques to improve joint

## 2023-08-25 ENCOUNTER — TREATMENT (OUTPATIENT)
Age: 58
End: 2023-08-25

## 2023-08-25 DIAGNOSIS — M62.81 MUSCLE WEAKNESS: ICD-10-CM

## 2023-08-25 DIAGNOSIS — R26.9 ABNORMALITY OF GAIT: ICD-10-CM

## 2023-08-25 DIAGNOSIS — M54.50 CHRONIC LEFT-SIDED LOW BACK PAIN WITHOUT SCIATICA: ICD-10-CM

## 2023-08-25 DIAGNOSIS — M54.50 CHRONIC BILATERAL LOW BACK PAIN WITHOUT SCIATICA: Primary | ICD-10-CM

## 2023-08-25 DIAGNOSIS — G89.29 CHRONIC LEFT-SIDED LOW BACK PAIN WITHOUT SCIATICA: ICD-10-CM

## 2023-08-25 DIAGNOSIS — G89.29 CHRONIC BILATERAL LOW BACK PAIN WITHOUT SCIATICA: Primary | ICD-10-CM

## 2023-08-25 NOTE — PROGRESS NOTES
[facility] INT Hakeem Seat  [address] [facility] [address]  Dept: [phone]      Physical Therapy Progress Report     Referring MD Kayli Allred MD  Diagnosis:     ICD-10-CM    1. Chronic bilateral low back pain without sciatica  M54.50     G89.29       2. Abnormality of gait  R26.9       3. Muscle weakness  M62.81       4. Chronic left-sided low back pain without sciatica [M54.50, G89.29 (ICD-10-CM)]  M54.50     G89.29           Therapy precautions:None  Co-morbidities affecting plan of care: s/p L MARTINE 4/5/22, hypertension, OA  Chief complaints/history of injury: Pt underwent L MARTINE 4/5/2022 and as pt recovered she noted new onset L sided low back pain. Pain has not changed since that time. Pt had home health PT after surgery but did not address back pain. Current symptoms: Pain mainly L side of low back at belt line. Pain radiates across back when severe. No radiating pain into buttock/LE. L leg does go numb at times. Pt with no incontinence but does have some constipation. Pt has had 1-2 falls in the past year due to leg giving out with pain. Patient Stated Goals: be more active- cook a full meal without sitting, play with 8-13 year old nieces/nephews (throw ball, etc)    Total Timed Procedure Codes: 45 min, Total Time: 45 min Modifier needed: No  Episode visit count:  20/20 through 9/4/23     SUBJECTIVE   Nature of condition: Chronic (continuous duration > 3 months)  Describe current symptoms: Sharp pain L inguinal region with lifting. Started intermittently but now happens every time leg is lifting such as in/out of car. Back pain is improved from near constant to intermittent. Pain Assessment:  Pain location: L inguinal region/hip flexor    Average Pain/symptom intensity (0-10 scale)  Last 24 hours: 2/10  Last week (1-7 days): 2/10  How often do you feel symptoms? Occasionally (26-50%)    How much have your symptoms interfered with daily activities?  A little bit  How

## 2023-09-07 ENCOUNTER — TELEPHONE (OUTPATIENT)
Age: 58
End: 2023-09-07

## 2023-09-19 ENCOUNTER — NURSE ONLY (OUTPATIENT)
Dept: INTERNAL MEDICINE CLINIC | Facility: CLINIC | Age: 58
End: 2023-09-19

## 2023-09-19 DIAGNOSIS — I10 PRIMARY HYPERTENSION: ICD-10-CM

## 2023-09-19 LAB
ALBUMIN SERPL-MCNC: 3.5 G/DL (ref 3.5–5)
ALBUMIN/GLOB SERPL: 1.1 (ref 0.4–1.6)
ALP SERPL-CCNC: 132 U/L (ref 50–136)
ALT SERPL-CCNC: 15 U/L (ref 12–65)
ANION GAP SERPL CALC-SCNC: 6 MMOL/L (ref 2–11)
AST SERPL-CCNC: 13 U/L (ref 15–37)
BILIRUB SERPL-MCNC: 0.4 MG/DL (ref 0.2–1.1)
BUN SERPL-MCNC: 20 MG/DL (ref 6–23)
CALCIUM SERPL-MCNC: 9.3 MG/DL (ref 8.3–10.4)
CHLORIDE SERPL-SCNC: 113 MMOL/L (ref 101–110)
CHOLEST SERPL-MCNC: 161 MG/DL
CO2 SERPL-SCNC: 25 MMOL/L (ref 21–32)
CREAT SERPL-MCNC: 1.5 MG/DL (ref 0.6–1)
GLOBULIN SER CALC-MCNC: 3.2 G/DL (ref 2.8–4.5)
GLUCOSE SERPL-MCNC: 118 MG/DL (ref 65–100)
HDLC SERPL-MCNC: 54 MG/DL (ref 40–60)
HDLC SERPL: 3
LDLC SERPL CALC-MCNC: 87.4 MG/DL
POTASSIUM SERPL-SCNC: 4 MMOL/L (ref 3.5–5.1)
PROT SERPL-MCNC: 6.7 G/DL (ref 6.3–8.2)
SODIUM SERPL-SCNC: 144 MMOL/L (ref 133–143)
TRIGL SERPL-MCNC: 98 MG/DL (ref 35–150)
VLDLC SERPL CALC-MCNC: 19.6 MG/DL (ref 6–23)

## 2023-09-25 NOTE — TELEPHONE ENCOUNTER
[facility] INT Katherine Long  [address] [place] [id number]  Dept: [phone]      Physical Therapy Discharge/Discontinuation Note     Referring MD: Kacey Cosby MD    Patient has been discharged from therapy based on  doing well with home program .    Patient was  last seen for PT services on 8/25/2023. At that time, the patient appeared to be progressing well with therapy treatment. Therapy goals were partially met. Please see previous notes for objective findings. Called pt for follow up as discussed at last appt and pt reports that anterior hip pain is nearly gone with some pain \"in the joint\" and continued intermittent low back pain.   Pt continues to improve and is completing her home program.  Recommend continued HEP and pt will contact provider if she plateaus with progress or feels that she needs another brief course of therapy to progress program.

## 2023-09-27 ENCOUNTER — OFFICE VISIT (OUTPATIENT)
Dept: INTERNAL MEDICINE CLINIC | Facility: CLINIC | Age: 58
End: 2023-09-27

## 2023-09-27 VITALS
WEIGHT: 230 LBS | BODY MASS INDEX: 40.75 KG/M2 | TEMPERATURE: 98 F | OXYGEN SATURATION: 100 % | DIASTOLIC BLOOD PRESSURE: 68 MMHG | HEART RATE: 57 BPM | HEIGHT: 63 IN | SYSTOLIC BLOOD PRESSURE: 114 MMHG

## 2023-09-27 DIAGNOSIS — Z12.11 SCREENING FOR COLON CANCER: Primary | ICD-10-CM

## 2023-09-27 DIAGNOSIS — R73.01 IFG (IMPAIRED FASTING GLUCOSE): ICD-10-CM

## 2023-09-27 DIAGNOSIS — E78.2 MIXED HYPERLIPIDEMIA: ICD-10-CM

## 2023-09-27 DIAGNOSIS — Z23 NEEDS FLU SHOT: ICD-10-CM

## 2023-09-27 DIAGNOSIS — I10 PRIMARY HYPERTENSION: ICD-10-CM

## 2023-09-27 RX ORDER — ATORVASTATIN CALCIUM 10 MG/1
10 TABLET, FILM COATED ORAL DAILY
Qty: 30 TABLET | Refills: 11 | Status: SHIPPED | OUTPATIENT
Start: 2023-09-27

## 2023-09-27 RX ORDER — LISINOPRIL 20 MG/1
20 TABLET ORAL DAILY
Qty: 30 TABLET | Refills: 11 | Status: SHIPPED | OUTPATIENT
Start: 2023-09-27

## 2023-09-27 RX ORDER — HYDROCHLOROTHIAZIDE 25 MG/1
25 TABLET ORAL DAILY
Qty: 30 TABLET | Refills: 11 | Status: SHIPPED | OUTPATIENT
Start: 2023-09-27

## 2023-09-27 ASSESSMENT — ENCOUNTER SYMPTOMS
WHEEZING: 0
SHORTNESS OF BREATH: 0
ABDOMINAL PAIN: 0
NAUSEA: 0
DIARRHEA: 0
CONSTIPATION: 0
SINUS PAIN: 0
BACK PAIN: 1
VOMITING: 0

## 2023-09-27 NOTE — PROGRESS NOTES
with Auto Differential; Future  -     Comprehensive Metabolic Panel; Future  -     TSH; Future  -     Microalbumin / Creatinine Urine Ratio; Future  -     Hemoglobin A1C; Future    Primary hypertension  -     hydroCHLOROthiazide (HYDRODIURIL) 25 MG tablet; Take 1 tablet by mouth daily  -     lisinopril (PRINIVIL;ZESTRIL) 20 MG tablet; Take 1 tablet by mouth daily  -     Lipid Panel; Future  -     CBC with Auto Differential; Future  -     Comprehensive Metabolic Panel; Future  -     TSH; Future  -     Microalbumin / Creatinine Urine Ratio; Future  -     Hemoglobin A1C; Future    Needs flu shot  -     Influenza, FLUCELVAX, (age 10 mo+), IM, Preservative Free, 0.5 mL    IFG (impaired fasting glucose)  -     Lipid Panel; Future  -     CBC with Auto Differential; Future  -     Comprehensive Metabolic Panel; Future  -     TSH; Future  -     Microalbumin / Creatinine Urine Ratio;  Future  -     Hemoglobin A1C; Future                 Darrin Heredia,

## 2023-10-18 ENCOUNTER — OFFICE VISIT (OUTPATIENT)
Dept: ORTHOPEDIC SURGERY | Age: 58
End: 2023-10-18

## 2023-10-18 DIAGNOSIS — Z09 FOLLOW-UP EXAMINATION: ICD-10-CM

## 2023-10-18 DIAGNOSIS — M54.50 LOW BACK PAIN AT MULTIPLE SITES: ICD-10-CM

## 2023-10-18 DIAGNOSIS — M70.62 GREATER TROCHANTERIC BURSITIS OF LEFT HIP: ICD-10-CM

## 2023-10-18 DIAGNOSIS — Z96.642 PRESENCE OF LEFT ARTIFICIAL HIP JOINT: Primary | ICD-10-CM

## 2023-10-18 RX ORDER — METHYLPREDNISOLONE ACETATE 40 MG/ML
40 INJECTION, SUSPENSION INTRA-ARTICULAR; INTRALESIONAL; INTRAMUSCULAR; SOFT TISSUE ONCE
Status: COMPLETED | OUTPATIENT
Start: 2023-10-18 | End: 2023-10-18

## 2023-10-18 RX ADMIN — METHYLPREDNISOLONE ACETATE 40 MG: 40 INJECTION, SUSPENSION INTRA-ARTICULAR; INTRALESIONAL; INTRAMUSCULAR; SOFT TISSUE at 11:07

## 2023-10-18 NOTE — PROGRESS NOTES
Name: Monty Avendaño  YOB: 1965  Gender: female  MRN: [id number]    CHIEF COMPLAINT: LEFT HIP PAIN    HPI:  The pain has been present for months. She does have a history of a left MARTINE performed Dr. Juan Patel in 2022. There was not an acute injury to the hip. The pain is located over the lateral aspect. She does note some pain to radiate into the groin on the left side. It does not hurt to walk too much of a degree. The pain does radiate down the leg to the thigh on the lateral aspect. Numbness and tingling are not noted below the knee. It hurts to lie on the affected hip over the lateral aspect. Treatment so far has been anti-inflammatory medications. She has had previous relief with a bursa injection as well as outpatient physical therapy for her back. Currently she is walking without assistive device. Past Medical History: Allergies:  No Known Allergies    Medications:  Current Outpatient Medications   Medication Sig    atorvastatin (LIPITOR) 10 MG tablet Take 1 tablet by mouth daily    hydroCHLOROthiazide (HYDRODIURIL) 25 MG tablet Take 1 tablet by mouth daily    lisinopril (PRINIVIL;ZESTRIL) 20 MG tablet Take 1 tablet by mouth daily     No current facility-administered medications for this visit. Past Medical history:  Past Medical History:   Diagnosis Date    Anxiety     BMI 35.0-35.9,adult     Current smoker     <0.25 pack/day    High cholesterol     on med for control     History of Bell's palsy     Hypertension     on med for control     OA (osteoarthritis)         has a past surgical history that includes Colonoscopy; Appendectomy; Tonsillectomy; and joint replacement (Left). Family History:  [unfilled]     Social History:  [unfilled]    Review of Systems:         PHYSICAL EXAMINATION:   The patient appears their stated age and they are in no distress. Circulation is normal.  Skin is normal.  Sensation is intact.     General medical appearance is

## 2023-11-21 ENCOUNTER — TELEPHONE (OUTPATIENT)
Age: 58
End: 2023-11-21

## 2023-11-21 NOTE — TELEPHONE ENCOUNTER
Pt left voicemail cancelling today's appt < 24 hours due to flu.   Returned call and rescheduled to 12/7/23 at 10 am.

## 2023-12-01 ENCOUNTER — OFFICE VISIT (OUTPATIENT)
Dept: ORTHOPEDIC SURGERY | Age: 58
End: 2023-12-01
Payer: MEDICAID

## 2023-12-01 DIAGNOSIS — M47.816 LUMBAR FACET ARTHROPATHY: ICD-10-CM

## 2023-12-01 DIAGNOSIS — R29.898 WEAKNESS OF LEFT HIP: ICD-10-CM

## 2023-12-01 DIAGNOSIS — M25.552 LEFT HIP PAIN: ICD-10-CM

## 2023-12-01 DIAGNOSIS — M54.16 LUMBAR RADICULOPATHY: ICD-10-CM

## 2023-12-01 DIAGNOSIS — M54.50 LOW BACK PAIN, UNSPECIFIED BACK PAIN LATERALITY, UNSPECIFIED CHRONICITY, UNSPECIFIED WHETHER SCIATICA PRESENT: Primary | ICD-10-CM

## 2023-12-01 PROCEDURE — 99204 OFFICE O/P NEW MOD 45 MIN: CPT | Performed by: NURSE PRACTITIONER

## 2023-12-01 RX ORDER — METHYLPREDNISOLONE 4 MG/1
TABLET ORAL
Qty: 1 KIT | Refills: 0 | Status: SHIPPED | OUTPATIENT
Start: 2023-12-01

## 2023-12-01 RX ORDER — CYCLOBENZAPRINE HCL 5 MG
5 TABLET ORAL 3 TIMES DAILY PRN
Qty: 30 TABLET | Refills: 0 | Status: SHIPPED | OUTPATIENT
Start: 2023-12-01 | End: 2023-12-11

## 2023-12-01 NOTE — PROGRESS NOTES
completed. - Muscle Relaxant: The patient was prescribed a muscle relaxant. The patient understands that this is a temporary measure to bring acute spasm under control.    - A MRI was ordered to delineate anatomy, confirm the diagnosis and assess the severity. Further deliniation of anatomy will allow provider to recommend further appropriate treatment plan including lumbar steroid injections vs. surgery. 4 This is a undiagnosed new problem with uncertain prognosis    Orders Placed This Encounter   Medications    cyclobenzaprine (FLEXERIL) 5 MG tablet     Sig: Take 1 tablet by mouth 3 times daily as needed for Muscle spasms     Dispense:  30 tablet     Refill:  0    methylPREDNISolone (MEDROL DOSEPACK) 4 MG tablet     Sig: Take by mouth as directed. Start in morning     Dispense:  1 kit     Refill:  0        Orders Placed This Encounter   Procedures    XR LUMBAR SPINE (2-3 VIEWS)    MRI LUMBAR SPINE WO CONTRAST            Return for MRI results. BOBY Kaminski - RICHARD  12/01/23      Elements of this note were created using speech recognition software. As such, errors of speech recognition may be present.

## 2023-12-07 ENCOUNTER — EVALUATION (OUTPATIENT)
Age: 58
End: 2023-12-07
Payer: MEDICAID

## 2023-12-07 DIAGNOSIS — R26.9 ABNORMALITY OF GAIT: Primary | ICD-10-CM

## 2023-12-07 DIAGNOSIS — M54.50 CHRONIC LEFT-SIDED LOW BACK PAIN WITHOUT SCIATICA: ICD-10-CM

## 2023-12-07 DIAGNOSIS — M54.50 LOW BACK PAIN AT MULTIPLE SITES: ICD-10-CM

## 2023-12-07 DIAGNOSIS — M25.552 LEFT HIP PAIN: ICD-10-CM

## 2023-12-07 DIAGNOSIS — Z96.642 PRESENCE OF LEFT ARTIFICIAL HIP JOINT: ICD-10-CM

## 2023-12-07 DIAGNOSIS — M70.62 GREATER TROCHANTERIC BURSITIS OF LEFT HIP: ICD-10-CM

## 2023-12-07 DIAGNOSIS — G89.29 CHRONIC LEFT-SIDED LOW BACK PAIN WITHOUT SCIATICA: ICD-10-CM

## 2023-12-07 DIAGNOSIS — M62.81 MUSCLE WEAKNESS: ICD-10-CM

## 2023-12-07 DIAGNOSIS — Z74.09 IMPAIRED FUNCTIONAL MOBILITY, BALANCE, GAIT, AND ENDURANCE: ICD-10-CM

## 2023-12-07 PROCEDURE — 97110 THERAPEUTIC EXERCISES: CPT | Performed by: PHYSICAL THERAPIST

## 2023-12-07 PROCEDURE — 97162 PT EVAL MOD COMPLEX 30 MIN: CPT | Performed by: PHYSICAL THERAPIST

## 2023-12-07 NOTE — PROGRESS NOTES
[facility] PT INT Margarita Mejia  [address] [place] [phone]  Dept: [phone]      Physical Therapy Initial Assessment     Referring MD: Hemalatha Urias MD  Diagnosis:     ICD-10-CM    1. Abnormality of gait  R26.9       2. Muscle weakness  M62.81       3. Chronic left-sided low back pain without sciatica [M54.50, G89.29 (ICD-10-CM)]  M54.50     G89.29       4. Left hip pain  M25.552       5. Impaired functional mobility, balance, gait, and endurance  Z74.09          Therapy precautions:None  Co-morbidities affecting plan of care: s/p L MARTINE 4/5/22, hypertension, OA  Total Timed Procedure Codes: 15 min, Total Time: 40 min Modifier needed: No  Episode visit count:  1     PERTINENT MEDICAL HISTORY     Past medical and surgical history:   Past Medical History:   Diagnosis Date    Anxiety     BMI 35.0-35.9,adult     Current smoker     <0.25 pack/day    High cholesterol     on med for control     History of Bell's palsy     Hypertension     on med for control     OA (osteoarthritis)       Past Surgical History:   Procedure Laterality Date    APPENDECTOMY      COLONOSCOPY      JOINT REPLACEMENT Left     TONSILLECTOMY       Medications: reviewed in chart   Allergies: No Known Allergies     Diagnostic exams (per chart review):   x-rays L hip 10/18/23 demonstrate stable L MARTINE   X-rays lumbar spine 12/1/23:   She has lower lumbar facet arthropathy. There is a mild slight curvature to the right of the lumbosacral spine. There is diffuse disc degeneration noted throughout. Primarily the lower lumbar spine. No fractures or lesions. Interpretation: lumbar spondylosis     SUBJECTIVE     Chief complaints/history of injury:    Date symptoms began: 4/5/22  Lupillo Monae of condition: Chronic (continuous duration > 3 months)  Primary cause of current episode: Unspecified  How did symptoms start:  Pt underwent L MARTINE 4/5/2022 and as pt recovered she noted new onset L sided low back pain.  Pt had home health

## 2023-12-08 ENCOUNTER — TREATMENT (OUTPATIENT)
Age: 58
End: 2023-12-08

## 2023-12-08 DIAGNOSIS — M62.81 MUSCLE WEAKNESS: ICD-10-CM

## 2023-12-08 DIAGNOSIS — M54.50 CHRONIC LEFT-SIDED LOW BACK PAIN WITHOUT SCIATICA: ICD-10-CM

## 2023-12-08 DIAGNOSIS — R26.9 ABNORMALITY OF GAIT: Primary | ICD-10-CM

## 2023-12-08 DIAGNOSIS — M70.62 GREATER TROCHANTERIC BURSITIS OF LEFT HIP: ICD-10-CM

## 2023-12-08 DIAGNOSIS — G89.29 CHRONIC LEFT-SIDED LOW BACK PAIN WITHOUT SCIATICA: ICD-10-CM

## 2023-12-08 DIAGNOSIS — Z74.09 IMPAIRED FUNCTIONAL MOBILITY, BALANCE, GAIT, AND ENDURANCE: ICD-10-CM

## 2023-12-08 DIAGNOSIS — M25.552 LEFT HIP PAIN: ICD-10-CM

## 2023-12-08 NOTE — PROGRESS NOTES
strength, endurance and flexibility trunk and LEs. Nustep level 4 x 5 min  Hooklying abdominal brace H5sec x 10  Bent knee fall out w/ abdominal brace 2x10 B  Beginner bridge H5sec 2x10  Sidelying clamshell 2x10 B  Standing hip abduction against green CLX band 2x10 B  Standing  hip extension against green CLX band 2x10 B  Hooklying L adductor stretch H10sec x 10- pain free range  L heel slide with belt and slider x 10    ASSESSMENT     Pt with significant weakness LLE and L groin pain that increases with active hip flexion in all positions. Pt will have MRI on Wednesday of lumbar spine and will modify treatment as needed based on results. PLAN     Continue with core/hip strengthening . GOALS     Short term goals to be met by 1/4/2024  (4 weeks):  Patient will demonstrate good recall of HEP requiring no more than minimal verbal cuing for proper form and technique. Pt will increase strength to at least 3/5 L hip abduction for improved gait mechanics. Pt will complete supine L SLR with 6-inches elevation x 10. Pt will improve score on the Oswestry Low Back Pain Questionnaire to </= 43 % disability, demonstrating improved overall function. Long term goals to be met by 2/5/2024 (60 days):  Patient will be compliant and independent with a comprehensive HEP and activity progression. Pt will increase lower abdominal strength to at least  4/5 for improved spinal stabilization with functional activity. Pt will increase strength in L hip flexion/abduction/ER with MMT to at least 3+/5 for improved stability in gait  Pt will increase strength in L hip extension to 4/5 for improved gait stability. Pt will tolerate standing x 25 minutes to allow for cooking and cleaning with less frequent seated rest.  Pt will improve score on the Oswestry Low Back Pain Questionnaire to </= 38 % disability, demonstrating improved overall function.     [place]  Access Code: [email or web address]  URL:

## 2023-12-12 ENCOUNTER — TELEPHONE (OUTPATIENT)
Age: 58
End: 2023-12-12

## 2023-12-12 ENCOUNTER — TELEPHONE (OUTPATIENT)
Dept: ORTHOPEDIC SURGERY | Age: 58
End: 2023-12-12

## 2023-12-12 NOTE — TELEPHONE ENCOUNTER
LVM to notify pt that auth still pending for MRI at this time and will need to reschedule MRI for later date. Will attempt to contact later this date before cancelling appt.

## 2023-12-12 NOTE — TELEPHONE ENCOUNTER
Called to offer appt for 12/13/23 at 9:15 am as patient is scheduled only 1x this week. Pt accepted appt and scheduled.

## 2023-12-13 ENCOUNTER — TREATMENT (OUTPATIENT)
Age: 58
End: 2023-12-13

## 2023-12-13 DIAGNOSIS — R26.9 ABNORMALITY OF GAIT: Primary | ICD-10-CM

## 2023-12-13 DIAGNOSIS — Z74.09 IMPAIRED FUNCTIONAL MOBILITY, BALANCE, GAIT, AND ENDURANCE: ICD-10-CM

## 2023-12-13 DIAGNOSIS — M70.62 GREATER TROCHANTERIC BURSITIS OF LEFT HIP: ICD-10-CM

## 2023-12-13 DIAGNOSIS — M54.50 CHRONIC LEFT-SIDED LOW BACK PAIN WITHOUT SCIATICA: ICD-10-CM

## 2023-12-13 DIAGNOSIS — M62.81 MUSCLE WEAKNESS: ICD-10-CM

## 2023-12-13 DIAGNOSIS — M25.552 LEFT HIP PAIN: ICD-10-CM

## 2023-12-13 DIAGNOSIS — G89.29 CHRONIC LEFT-SIDED LOW BACK PAIN WITHOUT SCIATICA: ICD-10-CM

## 2023-12-13 NOTE — PROGRESS NOTES
[facility] [address] [address]  Dept: [phone]      Physical Therapy Daily Note     Referring MD: Marlen Zaman MD   Diagnosis:     ICD-10-CM    1. Abnormality of gait  R26.9       2. Muscle weakness  M62.81       3. Chronic left-sided low back pain without sciatica [M54.50, G89.29 (ICD-10-CM)]  M54.50     G89.29       4. Left hip pain  M25.552       5. Impaired functional mobility, balance, gait, and endurance  Z74.09       6. Greater trochanteric bursitis of left hip [M70.62]  M70.62            Therapy precautions:None  Co-morbidities affecting plan of care: s/p L MARTINE 4/5/22, hypertension, OA  Chief complaints/history of injury: Pt underwent L MARTINE 4/5/2022 and as pt recovered she noted new onset L sided low back pain. Pt had home health PT after surgery but did not address back pain. Pt completed PT at this location 6/7/23-8/25/23. Symptoms improved but did not completely resolve. Pt continued her HEP but noted increased low back pain after ending PT but pt cannot recall exactly when it started. She returned to [place] 10/18/23 and was given a steroid injection L hip that did not decrease symptoms. Pt also referred to spine team and saw Lisa Huang NP 12/1/23. MRI scheduled for 12/13/23 and provider follow up 12/20/23. Describe current symptoms: Central low back pain that radiates into L buttock, hip, and groin. Pt denies numbness/tingling and saddle paresthesia. Pt with significant weakness LLE and reports cramping both legs at night. Patient Stated Goals: be able to stand longer     Total Timed Procedure Codes: 43 min, Total Time: 43 min Modifier needed: No  Episode visit count:  3     SUBJECTIVE     Pt reports that MRI was rescheduled as insurance authorization not received yet. Pt reports that she is more painful today- pain located L low back and into L groin.      Medications: no changes since last session    OBJECTIVE     Treatment provided

## 2023-12-15 ENCOUNTER — TREATMENT (OUTPATIENT)
Age: 58
End: 2023-12-15

## 2023-12-15 DIAGNOSIS — Z74.09 IMPAIRED FUNCTIONAL MOBILITY, BALANCE, GAIT, AND ENDURANCE: ICD-10-CM

## 2023-12-15 DIAGNOSIS — M54.50 CHRONIC LEFT-SIDED LOW BACK PAIN WITHOUT SCIATICA: ICD-10-CM

## 2023-12-15 DIAGNOSIS — G89.29 CHRONIC LEFT-SIDED LOW BACK PAIN WITHOUT SCIATICA: ICD-10-CM

## 2023-12-15 DIAGNOSIS — M62.81 MUSCLE WEAKNESS: ICD-10-CM

## 2023-12-15 DIAGNOSIS — R26.9 ABNORMALITY OF GAIT: Primary | ICD-10-CM

## 2023-12-15 DIAGNOSIS — M25.552 LEFT HIP PAIN: ICD-10-CM

## 2023-12-15 DIAGNOSIS — M70.62 GREATER TROCHANTERIC BURSITIS OF LEFT HIP: ICD-10-CM

## 2023-12-15 NOTE — PROGRESS NOTES
[facility]  [address] [facility] [address]  Dept: [phone]      Physical Therapy Daily Note     Referring MD: Markie Bain MD   Diagnosis:     ICD-10-CM    1. Abnormality of gait  R26.9       2. Muscle weakness  M62.81       3. Chronic left-sided low back pain without sciatica [M54.50, G89.29 (ICD-10-CM)]  M54.50     G89.29       4. Left hip pain  M25.552       5. Impaired functional mobility, balance, gait, and endurance  Z74.09       6. Greater trochanteric bursitis of left hip [M70.62]  M70.62              Therapy precautions:None  Co-morbidities affecting plan of care: s/p L MARTINE 4/5/22, hypertension, OA  Chief complaints/history of injury: Pt underwent L MARTINE 4/5/2022 and as pt recovered she noted new onset L sided low back pain. Pt had home health PT after surgery but did not address back pain. Pt completed PT at this location 6/7/23-8/25/23. Symptoms improved but did not completely resolve. Pt continued her HEP but noted increased low back pain after ending PT but pt cannot recall exactly when it started. She returned to [place] 10/18/23 and was given a steroid injection L hip that did not decrease symptoms. Pt also referred to spine team and saw Chely Silveira NP 12/1/23. MRI scheduled for 12/13/23 and provider follow up 12/20/23. Describe current symptoms: Central low back pain that radiates into L buttock, hip, and groin. Pt denies numbness/tingling and saddle paresthesia. Pt with significant weakness LLE and reports cramping both legs at night. Patient Stated Goals: be able to stand longer     Total Timed Procedure Codes: 45 min, Total Time: 45 min Modifier needed: No  Episode visit count:  4     SUBJECTIVE     Pt reports that pain levels have improved since last session. Pain located mainly L inguinal region.     Medications: no changes since last session    OBJECTIVE     Treatment provided today:  Therapeutic exercise (24541) x 35 min to develop ROM,

## 2023-12-26 ENCOUNTER — TELEPHONE (OUTPATIENT)
Age: 58
End: 2023-12-26

## 2023-12-26 NOTE — TELEPHONE ENCOUNTER
Pt cancelled > 24 hours for their scheduled therapy appointment today. Reason:  too much pain  Communication: Pt left voicemail requesting appt cancellation 12/22/23. I returned call 12/26/23. Pt reported that pain levels L groin are too high. Pt tearful on phone and states that she took a muscle relaxant so hoping that pain will decrease. Rescheduled appt for 12/27/23- will focus on pain control.

## 2023-12-27 ENCOUNTER — TREATMENT (OUTPATIENT)
Age: 58
End: 2023-12-27
Payer: MEDICAID

## 2023-12-27 DIAGNOSIS — G89.29 CHRONIC LEFT-SIDED LOW BACK PAIN WITHOUT SCIATICA: ICD-10-CM

## 2023-12-27 DIAGNOSIS — M25.552 LEFT HIP PAIN: ICD-10-CM

## 2023-12-27 DIAGNOSIS — M70.62 GREATER TROCHANTERIC BURSITIS OF LEFT HIP: ICD-10-CM

## 2023-12-27 DIAGNOSIS — Z74.09 IMPAIRED FUNCTIONAL MOBILITY, BALANCE, GAIT, AND ENDURANCE: ICD-10-CM

## 2023-12-27 DIAGNOSIS — M54.50 CHRONIC LEFT-SIDED LOW BACK PAIN WITHOUT SCIATICA: ICD-10-CM

## 2023-12-27 DIAGNOSIS — R26.9 ABNORMALITY OF GAIT: Primary | ICD-10-CM

## 2023-12-27 DIAGNOSIS — M62.81 MUSCLE WEAKNESS: ICD-10-CM

## 2023-12-27 PROCEDURE — 97110 THERAPEUTIC EXERCISES: CPT | Performed by: PHYSICAL THERAPIST

## 2023-12-27 PROCEDURE — 97140 MANUAL THERAPY 1/> REGIONS: CPT | Performed by: PHYSICAL THERAPIST

## 2023-12-27 NOTE — PROGRESS NOTES
spine MD for a second opinion on Friday. Medications: no changes since last session    OBJECTIVE     Treatment provided today:  Therapeutic exercise (82497) x 30 min to develop ROM, strength, endurance and flexibility trunk and LEs. Upright bike level 2 x 5 min (3 holes showing)  Hooklying abdominal brace H5sec x 10  Bent knee fall out w/ abdominal brace 2x10 B  Beginner bridge w/ adductor squeeze H5sec 3x10  Supine LLE long axis IR/ER against moderate manual resistance x 15  Sidelying clamshell 2x5 L w/ pillow between knees  Standing hip flexion/abduction/extension using slider at barre x 10 B  Manual therapy (52942) x 10 min utilizing techniques to improve joint and/or soft tissue mobility, ROM, and function as well as helping to decrease pain/spasms and swelling. Palpation and assessment of soft tissue, muscles, and landmarks   STM L gluteals in R sidelying  STM L hip flexors in supine    ASSESSMENT     Pt was very painful over the last few days but reports decreased symptoms today. She continues to demonstrate great difficulty with active L hip flexion in all positions. PLAN     Continue with core/hip strengthening . Continue progressing standing exercises. GOALS     Short term goals to be met by 1/4/2024  (4 weeks):  Patient will demonstrate good recall of HEP requiring no more than minimal verbal cuing for proper form and technique. Pt will increase strength to at least 3/5 L hip abduction for improved gait mechanics. Pt will complete supine L SLR with 6-inches elevation x 10. Pt will improve score on the Oswestry Low Back Pain Questionnaire to </= 43 % disability, demonstrating improved overall function. Long term goals to be met by 2/5/2024 (60 days):  Patient will be compliant and independent with a comprehensive HEP and activity progression. Pt will increase lower abdominal strength to at least  4/5 for improved spinal stabilization with functional activity.   Pt will increase strength

## 2023-12-28 DIAGNOSIS — Z96.642 PRESENCE OF LEFT ARTIFICIAL HIP JOINT: ICD-10-CM

## 2023-12-28 DIAGNOSIS — M54.50 LOW BACK PAIN AT MULTIPLE SITES: ICD-10-CM

## 2023-12-28 DIAGNOSIS — M70.62 GREATER TROCHANTERIC BURSITIS OF LEFT HIP: ICD-10-CM

## 2023-12-28 RX ORDER — DICLOFENAC SODIUM 75 MG/1
75 TABLET, DELAYED RELEASE ORAL 2 TIMES DAILY
Qty: 60 TABLET | Refills: 0 | OUTPATIENT
Start: 2023-12-28

## 2023-12-29 ENCOUNTER — TREATMENT (OUTPATIENT)
Age: 58
End: 2023-12-29
Payer: MEDICAID

## 2023-12-29 DIAGNOSIS — M62.81 MUSCLE WEAKNESS: ICD-10-CM

## 2023-12-29 DIAGNOSIS — G89.29 CHRONIC LEFT-SIDED LOW BACK PAIN WITHOUT SCIATICA: ICD-10-CM

## 2023-12-29 DIAGNOSIS — M54.50 CHRONIC LEFT-SIDED LOW BACK PAIN WITHOUT SCIATICA: ICD-10-CM

## 2023-12-29 DIAGNOSIS — Z74.09 IMPAIRED FUNCTIONAL MOBILITY, BALANCE, GAIT, AND ENDURANCE: ICD-10-CM

## 2023-12-29 DIAGNOSIS — R26.9 ABNORMALITY OF GAIT: Primary | ICD-10-CM

## 2023-12-29 DIAGNOSIS — M25.552 LEFT HIP PAIN: ICD-10-CM

## 2023-12-29 DIAGNOSIS — M70.62 GREATER TROCHANTERIC BURSITIS OF LEFT HIP: ICD-10-CM

## 2023-12-29 PROCEDURE — 97140 MANUAL THERAPY 1/> REGIONS: CPT | Performed by: PHYSICAL THERAPIST

## 2023-12-29 PROCEDURE — 97110 THERAPEUTIC EXERCISES: CPT | Performed by: PHYSICAL THERAPIST

## 2023-12-29 NOTE — PROGRESS NOTES
[facility]  [address] [place] [address]  Dept: [phone]      Physical Therapy Daily Note     Referring MD: Christelle Serra MD   Diagnosis:     ICD-10-CM    1. Abnormality of gait  R26.9       2. Muscle weakness  M62.81       3. Chronic left-sided low back pain without sciatica [M54.50, G89.29 (ICD-10-CM)]  M54.50     G89.29       4. Left hip pain  M25.552       5. Greater trochanteric bursitis of left hip [M70.62]  M70.62       6. Impaired functional mobility, balance, gait, and endurance  Z74.09         Therapy precautions:None  Co-morbidities affecting plan of care: s/p L MARTINE 4/5/22, hypertension, OA  Chief complaints/history of injury: Pt underwent L MARTINE 4/5/2022 and as pt recovered she noted new onset L sided low back pain. Pt had home health PT after surgery but did not address back pain. Pt completed PT at this location 6/7/23-8/25/23. Symptoms improved but did not completely resolve. Pt continued her HEP but noted increased low back pain after ending PT but pt cannot recall exactly when it started. She returned to [place] 10/18/23 and was given a steroid injection L hip that did not decrease symptoms. Pt also referred to spine team and saw Ania Casiano NP 12/1/23. MRI scheduled for 12/13/23 and provider follow up 12/20/23. Describe current symptoms: Central low back pain that radiates into L buttock, hip, and groin. Pt denies numbness/tingling and saddle paresthesia. Pt with significant weakness LLE and reports cramping both legs at night. Patient Stated Goals: be able to stand longer     Total Timed Procedure Codes: 40 min, Total Time: 40 min Modifier needed: No  Episode visit count:  7/20 through 2/16/24     SUBJECTIVE     Pt missed her MRI appt yesterday and had to reschedule. Pt notes increased L anterior hip/inguinal pain today.      Medications: no changes since last session    OBJECTIVE     Treatment provided today:  Therapeutic exercise (33202) x 30

## 2024-01-03 ENCOUNTER — TREATMENT (OUTPATIENT)
Age: 59
End: 2024-01-03
Payer: MEDICAID

## 2024-01-03 DIAGNOSIS — G89.29 CHRONIC LEFT-SIDED LOW BACK PAIN WITHOUT SCIATICA: ICD-10-CM

## 2024-01-03 DIAGNOSIS — R26.9 ABNORMALITY OF GAIT: Primary | ICD-10-CM

## 2024-01-03 DIAGNOSIS — M70.62 GREATER TROCHANTERIC BURSITIS OF LEFT HIP: ICD-10-CM

## 2024-01-03 DIAGNOSIS — M62.81 MUSCLE WEAKNESS: ICD-10-CM

## 2024-01-03 DIAGNOSIS — Z74.09 IMPAIRED FUNCTIONAL MOBILITY, BALANCE, GAIT, AND ENDURANCE: ICD-10-CM

## 2024-01-03 DIAGNOSIS — M54.50 CHRONIC LEFT-SIDED LOW BACK PAIN WITHOUT SCIATICA: ICD-10-CM

## 2024-01-03 DIAGNOSIS — M25.552 LEFT HIP PAIN: ICD-10-CM

## 2024-01-03 PROCEDURE — 97110 THERAPEUTIC EXERCISES: CPT | Performed by: PHYSICAL THERAPIST

## 2024-01-03 PROCEDURE — 97140 MANUAL THERAPY 1/> REGIONS: CPT | Performed by: PHYSICAL THERAPIST

## 2024-01-04 NOTE — PROGRESS NOTES
[facility]  [address] [facility] [address]  Dept: [phone]      Physical Therapy Progress Report     Referring MD: Mikey Vargas MD   Diagnosis:     ICD-10-CM    1. Abnormality of gait  R26.9       2. Muscle weakness  M62.81       3. Left hip pain  M25.552       4. Greater trochanteric bursitis of left hip [M70.62]  M70.62       5. Chronic left-sided low back pain without sciatica [M54.50, G89.29 (ICD-10-CM)]  M54.50     G89.29       6. Impaired functional mobility, balance, gait, and endurance  Z74.09         Therapy precautions:None  Co-morbidities affecting plan of care: s/p L MARTINE 4/5/22, hypertension, OA  Chief complaints/history of injury: Pt underwent L MARTINE 4/5/2022 and as pt recovered she noted new onset L sided low back pain. Pt had home health PT after surgery but did not address back pain. Pt completed PT at this location 6/7/23-8/25/23. Symptoms improved but did not completely resolve. Pt continued her HEP but noted increased low back pain after ending PT but pt cannot recall exactly when it started. She returned to [place] 10/18/23 and was given a steroid injection L hip that did not decrease symptoms. Pt also referred to spine team and saw CORRY Ortega NP 12/1/23. MRI scheduled for 12/13/23 and provider follow up 12/20/23.   Describe current symptoms: Central low back pain that radiates into L buttock, hip, and groin.  Pt denies numbness/tingling and saddle paresthesia. Pt with significant weakness LLE and reports cramping both legs at night.   Patient Stated Goals: be able to stand longer     Total Timed Procedure Codes: 40 min, Total Time: 40 min Modifier needed: No  Episode visit count:  9/20 through 2/16/24     SUBJECTIVE     Pt had her MRI and will see CORRY Ortega later today for results. Other appt was with [facility] to evaluate for disability. Pt reports that pain is varied, was slightly improved for several sessions but has been worse the past few

## 2024-01-05 ENCOUNTER — TREATMENT (OUTPATIENT)
Age: 59
End: 2024-01-05

## 2024-01-05 DIAGNOSIS — M70.62 GREATER TROCHANTERIC BURSITIS OF LEFT HIP: ICD-10-CM

## 2024-01-05 DIAGNOSIS — G89.29 CHRONIC LEFT-SIDED LOW BACK PAIN WITHOUT SCIATICA: ICD-10-CM

## 2024-01-05 DIAGNOSIS — R26.9 ABNORMALITY OF GAIT: Primary | ICD-10-CM

## 2024-01-05 DIAGNOSIS — M62.81 MUSCLE WEAKNESS: ICD-10-CM

## 2024-01-05 DIAGNOSIS — Z74.09 IMPAIRED FUNCTIONAL MOBILITY, BALANCE, GAIT, AND ENDURANCE: ICD-10-CM

## 2024-01-05 DIAGNOSIS — M25.552 LEFT HIP PAIN: ICD-10-CM

## 2024-01-05 DIAGNOSIS — M54.50 CHRONIC LEFT-SIDED LOW BACK PAIN WITHOUT SCIATICA: ICD-10-CM

## 2024-01-11 NOTE — PROGRESS NOTES
[facility]  [address] [place] [address]  Dept: [phone]      Physical Therapy Daily Note     Referring MD: Mikey Vargas MD   Diagnosis:     ICD-10-CM    1. Abnormality of gait  R26.9       2. Muscle weakness  M62.81       3. Left hip pain  M25.552       4. Greater trochanteric bursitis of left hip [M70.62]  M70.62       5. Chronic left-sided low back pain without sciatica [M54.50, G89.29 (ICD-10-CM)]  M54.50     G89.29       6. Impaired functional mobility, balance, gait, and endurance  Z74.09         Therapy precautions:None  Co-morbidities affecting plan of care: s/p L MARTINE 4/5/22, hypertension, OA  Chief complaints/history of injury: Pt underwent L MARTINE 4/5/2022 and as pt recovered she noted new onset L sided low back pain. Pt had home health PT after surgery but did not address back pain. Pt completed PT at this location 6/7/23-8/25/23. Symptoms improved but did not completely resolve. Pt continued her HEP but noted increased low back pain after ending PT but pt cannot recall exactly when it started. She returned to [place] 10/18/23 and was given a steroid injection L hip that did not decrease symptoms. Pt also referred to spine team and saw CORRY Ortega NP 12/1/23. MRI scheduled for 12/13/23 and provider follow up 12/20/23.   Describe current symptoms: Central low back pain that radiates into L buttock, hip, and groin.  Pt denies numbness/tingling and saddle paresthesia. Pt with significant weakness LLE and reports cramping both legs at night.   Patient Stated Goals: be able to stand longer     Total Timed Procedure Codes: 40 min, Total Time: 40 min Modifier needed: No  Episode visit count:  10/20 through 2/16/24     SUBJECTIVE     Pt rescheduled her appt with NP as provider was running late. She rescheduled for 1/18/24. She notes increased low back pain today with continued L hip pain.     Medications: no changes since last session    OBJECTIVE     Treatment

## 2024-01-12 ENCOUNTER — TREATMENT (OUTPATIENT)
Age: 59
End: 2024-01-12
Payer: MEDICAID

## 2024-01-12 DIAGNOSIS — G89.29 CHRONIC LEFT-SIDED LOW BACK PAIN WITHOUT SCIATICA: ICD-10-CM

## 2024-01-12 DIAGNOSIS — M25.552 LEFT HIP PAIN: ICD-10-CM

## 2024-01-12 DIAGNOSIS — M62.81 MUSCLE WEAKNESS: ICD-10-CM

## 2024-01-12 DIAGNOSIS — M70.62 GREATER TROCHANTERIC BURSITIS OF LEFT HIP: ICD-10-CM

## 2024-01-12 DIAGNOSIS — M54.50 CHRONIC LEFT-SIDED LOW BACK PAIN WITHOUT SCIATICA: ICD-10-CM

## 2024-01-12 DIAGNOSIS — R26.9 ABNORMALITY OF GAIT: Primary | ICD-10-CM

## 2024-01-12 DIAGNOSIS — Z74.09 IMPAIRED FUNCTIONAL MOBILITY, BALANCE, GAIT, AND ENDURANCE: ICD-10-CM

## 2024-01-12 PROCEDURE — 97110 THERAPEUTIC EXERCISES: CPT | Performed by: PHYSICAL THERAPIST

## 2024-01-12 PROCEDURE — 97140 MANUAL THERAPY 1/> REGIONS: CPT | Performed by: PHYSICAL THERAPIST

## 2024-01-17 ENCOUNTER — TELEPHONE (OUTPATIENT)
Age: 59
End: 2024-01-17

## 2024-01-17 NOTE — TELEPHONE ENCOUNTER
Pt did not show for their scheduled therapy appointment today.    Reason:  pt forgot  Communication: called pt and she stated that she had the day wrong. Pt not able to attend in any openings this week.

## 2024-01-18 ENCOUNTER — OFFICE VISIT (OUTPATIENT)
Dept: ORTHOPEDIC SURGERY | Age: 59
End: 2024-01-18
Payer: MEDICAID

## 2024-01-18 DIAGNOSIS — M54.16 LUMBAR RADICULOPATHY: Primary | ICD-10-CM

## 2024-01-18 DIAGNOSIS — M48.061 LUMBAR FORAMINAL STENOSIS: ICD-10-CM

## 2024-01-18 DIAGNOSIS — M46.1 SACROILIITIS (HCC): ICD-10-CM

## 2024-01-18 PROCEDURE — 99214 OFFICE O/P EST MOD 30 MIN: CPT | Performed by: NURSE PRACTITIONER

## 2024-01-18 NOTE — PROGRESS NOTES
injections depending on the response. The injection should be a left L5 SNRB   -Referral to [facility] non-surgical spine provider for evaluation and treatment.      No orders of the defined types were placed in this encounter.       Orders Placed This Encounter   Procedures    Injection Authorization - Spine        4 This is a chronic illness/condition with exacerbation and progression      Return for refer to [facility].     BOBY Willoughby - CNP  01/18/24      Elements of this note were created using speech recognition software.  As such, errors of speech recognition may be present.

## 2024-01-25 ENCOUNTER — TREATMENT (OUTPATIENT)
Age: 59
End: 2024-01-25

## 2024-01-25 DIAGNOSIS — M54.50 CHRONIC LEFT-SIDED LOW BACK PAIN WITHOUT SCIATICA: ICD-10-CM

## 2024-01-25 DIAGNOSIS — M25.552 LEFT HIP PAIN: ICD-10-CM

## 2024-01-25 DIAGNOSIS — G89.29 CHRONIC LEFT-SIDED LOW BACK PAIN WITHOUT SCIATICA: ICD-10-CM

## 2024-01-25 DIAGNOSIS — Z74.09 IMPAIRED FUNCTIONAL MOBILITY, BALANCE, GAIT, AND ENDURANCE: ICD-10-CM

## 2024-01-25 DIAGNOSIS — R26.9 ABNORMALITY OF GAIT: Primary | ICD-10-CM

## 2024-01-25 DIAGNOSIS — M62.81 MUSCLE WEAKNESS: ICD-10-CM

## 2024-01-25 DIAGNOSIS — M70.62 GREATER TROCHANTERIC BURSITIS OF LEFT HIP: ICD-10-CM

## 2024-01-25 NOTE — PROGRESS NOTES
LEs.  Upright bike level 2 x 5 min (3 holes showing)  Hooklying abdominal brace H5sec x 10  Supine hip abduction against orange R-loop at knees 2x10 on sliding board  Bent knee fall out w/ abdominal brace 2x10 B  Beginner bridge w/ adductor squeeze H5sec 2x8   Sidelying clamshell 3x5 L w/ pillow between knees  Standing hip flexion/abduction/extension x10 L  Manual therapy (94609) x 10 min utilizing techniques to improve joint and/or soft tissue mobility, ROM, and function as well as helping to decrease pain/spasms and swelling.  Palpation and assessment of soft tissue, muscles, and landmarks   STM L lumbar paraspinals/quadratus in R sidelying with pillow between knees  STM L gluteals in R sidelying    ASSESSMENT     Pt scheduled for lumbar injection 1/26/24. Will retest strength next week to determine if any changes.  Plan of care may need to be extended due to persistent LLE weakness in both flexion and abduction.    PLAN     Continue with core/hip strengthening . Continue progressing standing exercises as tolerated.     GOALS     Short term goals to be met by 1/4/2024  (4 weeks):  Patient will demonstrate good recall of HEP requiring no more than minimal verbal cuing for proper form and technique. Goal Met 1/5/2024   Pt will increase strength to at least 3/5 L hip abduction for improved gait mechanics. Goal Not Met 1/5/2024 - Continue   Pt will complete supine L SLR with 6-inches elevation x 10. Goal Not Met 1/5/2024 - Continue   Pt will improve score on the Oswestry Low Back Pain Questionnaire to </= 43 % disability, demonstrating improved overall function. NT    Long term goals to be met by 2/5/2024 (60 days):  Patient will be compliant and independent with a comprehensive HEP and activity progression.  Pt will increase lower abdominal strength to at least  4/5 for improved spinal stabilization with functional activity.  Pt will increase strength in L hip flexion/abduction/ER with MMT to at least 3+/5 for

## 2024-01-26 ENCOUNTER — OFFICE VISIT (OUTPATIENT)
Dept: ORTHOPEDIC SURGERY | Age: 59
End: 2024-01-26

## 2024-01-26 VITALS — BODY MASS INDEX: 40.75 KG/M2 | WEIGHT: 230 LBS | HEIGHT: 63 IN

## 2024-01-26 DIAGNOSIS — M54.16 LUMBAR RADICULOPATHY: Primary | ICD-10-CM

## 2024-01-26 RX ORDER — TRIAMCINOLONE ACETONIDE 40 MG/ML
80 INJECTION, SUSPENSION INTRA-ARTICULAR; INTRAMUSCULAR ONCE
Status: COMPLETED | OUTPATIENT
Start: 2024-01-26 | End: 2024-01-26

## 2024-01-26 RX ADMIN — TRIAMCINOLONE ACETONIDE 80 MG: 40 INJECTION, SUSPENSION INTRA-ARTICULAR; INTRAMUSCULAR at 14:34

## 2024-01-26 NOTE — PROGRESS NOTES
Date: 01/26/24   Name: Alma Weir    Pre-Procedural Diagnosis:    Diagnosis Orders   1. Lumbar radiculopathy  triamcinolone acetonide (KENALOG-40) injection 80 mg    FL NERVE BLOCK LUMBOSACRAL 1ST          Procedure: Selective Nerve Root Blocks (Transforaminal) - Single Level    Precautions: [facility] Precautions spine injections: None.  Patient denies any prior sensitivity to steroid, local anesthetic, contrast dye, iodine or shellfish.    The procedure was discussed at length with the patient and informed consent was signed. The patient was placed in a prone position on the fluoroscopy table and the skin was prepped and draped in a routine sterile fashion. The areas to be injected was/were anesthetized with approximately 5 cc of 1% Lidocaine. A 22-gauge 5 inch spinal needle was carefully advanced under fluoroscopic guidance to the left L5 transforaminal space(s). At this time 0.25 cc of omnipaque administered. Once proper placement was confirmed, 2 cc of 0.25% Marcaine and 80 mg of Kenalog were injected through the spinal needle at that/each site.     Fluoroscopic guidance was used intermittently over a 10-minute period to insure proper needle placement and patient safety. A hard copy of the fluoroscopic  images has been placed in the patient's chart. The patient was monitored after the procedure and discharged home in stable fashion.     A total of 2 cc of Kenalog were administered during this procedure.    Resume Meds:  N/A        L JERMAINE BUSTILLOS JR, MD  01/26/24

## 2024-01-30 NOTE — PROGRESS NOTES
improved spinal stabilization with functional activity.  Pt will increase strength in L hip flexion/abduction/ER with MMT to at least 3+/5 for improved stability in gait  Pt will increase strength in L hip extension to 4/5 for improved gait stability.  Pt will tolerate standing x 25 minutes to allow for cooking and cleaning with less frequent seated rest.  Pt will improve score on the Oswestry Low Back Pain Questionnaire to </= 38 % disability, demonstrating improved overall function.    [employer]  Access Code: [email or web address]  URL: [email or web address]  Date: 12/07/2023  Prepared by: Francia Jerome    Exercises  - Seated Slump Neural Tensioner  - 1-2 x daily - 2 sets - 10 reps - 2 hold  - Supine Transversus Abdominis Bracing - Hands on Stomach  - 2 x daily - 1 sets - 30 reps - 5 hold  - Clamshell  - 5 x weekly - 2 sets - 5 reps - 3 hold  - Beginner Bridge  - 5 x weekly - 2 sets - 10 reps - 5 hold

## 2024-01-31 ENCOUNTER — TREATMENT (OUTPATIENT)
Age: 59
End: 2024-01-31
Payer: MEDICAID

## 2024-01-31 DIAGNOSIS — R26.9 ABNORMALITY OF GAIT: Primary | ICD-10-CM

## 2024-01-31 DIAGNOSIS — M25.552 LEFT HIP PAIN: ICD-10-CM

## 2024-01-31 DIAGNOSIS — M70.62 GREATER TROCHANTERIC BURSITIS OF LEFT HIP: ICD-10-CM

## 2024-01-31 DIAGNOSIS — Z74.09 IMPAIRED FUNCTIONAL MOBILITY, BALANCE, GAIT, AND ENDURANCE: ICD-10-CM

## 2024-01-31 DIAGNOSIS — M62.81 MUSCLE WEAKNESS: ICD-10-CM

## 2024-01-31 PROCEDURE — 97140 MANUAL THERAPY 1/> REGIONS: CPT | Performed by: PHYSICAL THERAPIST

## 2024-01-31 PROCEDURE — 97110 THERAPEUTIC EXERCISES: CPT | Performed by: PHYSICAL THERAPIST

## 2024-02-02 ENCOUNTER — TREATMENT (OUTPATIENT)
Age: 59
End: 2024-02-02
Payer: MEDICAID

## 2024-02-02 DIAGNOSIS — M62.81 MUSCLE WEAKNESS: ICD-10-CM

## 2024-02-02 DIAGNOSIS — R26.9 ABNORMALITY OF GAIT: Primary | ICD-10-CM

## 2024-02-02 DIAGNOSIS — M25.552 LEFT HIP PAIN: ICD-10-CM

## 2024-02-02 DIAGNOSIS — M70.62 GREATER TROCHANTERIC BURSITIS OF LEFT HIP: ICD-10-CM

## 2024-02-02 DIAGNOSIS — Z74.09 IMPAIRED FUNCTIONAL MOBILITY, BALANCE, GAIT, AND ENDURANCE: ICD-10-CM

## 2024-02-02 PROCEDURE — 97140 MANUAL THERAPY 1/> REGIONS: CPT | Performed by: PHYSICAL THERAPIST

## 2024-02-02 PROCEDURE — 97110 THERAPEUTIC EXERCISES: CPT | Performed by: PHYSICAL THERAPIST

## 2024-02-02 NOTE — PROGRESS NOTES
palpation L hip flexors, IT band/lateral hip; no tenderness glute max and lumbar paraspinals    Functional Outcome Questionnaire: Oswestry Low Back Pain Disability Questionnaire:   12/7/23: 24/45= 53% functional deficit   2/2/24: 19/45 = 42% functional deficit    Strength/MMT (0-5 Scale):       R 12/7 R 2/2 L 12/7 L 2/2 Comment   Hip Flexion 5 5 3 3 Supine SLR 1-inch elevation   Hip Extension 4 4+ 3+ 4     Hip Abduction 5 5 3- 3     Hip ER 5 5 3 4  Pain L groin   Hip IR 5 5 3+ 4  Pain L groin   Knee Extension 5 5 4 4+     Knee Flexion 5 5 3+ 4+     Ankle DF 5 5 4 5     Ankle PF 5 5 5 5     Great toe extension 5 5 4 4+     Lower Abdominals 3+ 3+          Treatment provided today:  Therapeutic exercise (41720) x 45 min to develop ROM, strength, endurance and flexibility trunk and LEs.  Upright bike level 3 x 5 min (3 holes showing)  Objective measures as noted above  Supine L hip abduction 2x5  Prone hip extension x 10 B  Beginner bridge w/ adductor squeeze H5sec 3x10   Supine hip abduction against orange R-loop at knees 2x10 on sliding board  Hooklying clamshell w/ abdominal brace against teal R-loop 2x10 B  Standing hip abduction against red CLX band 2x10 B  Standing  hip extension against red CLX band 2x10 B  Standing hip flexion against red CLX band 2x10 B  Standing heel raises with glute set at top 3x10  Partial squats 2x10  Sidelying clamshell 3x5 L w/ pillow between kneesManual therapy (08270) x 10 min utilizing techniques to improve joint and/or soft tissue mobility, ROM, and function as well as helping to decrease pain/spasms and swelling.  Palpation and assessment of soft tissue, muscles, and landmarks   STM L hip flexors- manual and with tennis ball      ASSESSMENT     Start of care: 12/7/2023   As of 2/2/2024, Alma Weir has attended 13 PT sessions. Pt's attendance has been consistent with plan of care. Pt has progressed slower than anticipated with treatment.  She has met 3/4 short term goals, 1/6

## 2024-02-05 ENCOUNTER — TELEPHONE (OUTPATIENT)
Age: 59
End: 2024-02-05

## 2024-02-05 NOTE — TELEPHONE ENCOUNTER
Pt did not show for their scheduled therapy appointment today.    Reason: unknown  Communication: left voicemail requesting pt call back

## 2024-02-07 ENCOUNTER — TREATMENT (OUTPATIENT)
Age: 59
End: 2024-02-07

## 2024-02-07 DIAGNOSIS — M25.552 LEFT HIP PAIN: ICD-10-CM

## 2024-02-07 DIAGNOSIS — Z74.09 IMPAIRED FUNCTIONAL MOBILITY, BALANCE, GAIT, AND ENDURANCE: ICD-10-CM

## 2024-02-07 DIAGNOSIS — R26.9 ABNORMALITY OF GAIT: Primary | ICD-10-CM

## 2024-02-07 DIAGNOSIS — M62.81 MUSCLE WEAKNESS: ICD-10-CM

## 2024-02-07 DIAGNOSIS — M70.62 GREATER TROCHANTERIC BURSITIS OF LEFT HIP: ICD-10-CM

## 2024-02-07 NOTE — PROGRESS NOTES
function. Goal Met 2/2/2024     Long term goals to be met by 3/3/24  Patient will be compliant and independent with a comprehensive HEP and activity progression. Goal Not Met 2/2/2024 - Continue   Pt will increase lower abdominal strength to at least  4/5 for improved spinal stabilization with functional activity. Goal Not Met 2/2/2024 - Continue   Pt will increase strength in L hip flexion/abduction/ER with MMT to at least 3+/5 for improved stability in gait Goal Not Met 2/2/2024 - Continue for flexion/abduction  Pt will increase strength in L hip extension to 4/5 for improved gait stability. Goal Met 2/2/2024   Pt will tolerate standing x 25 minutes to allow for cooking and cleaning with less frequent seated rest. Goal Not Met 2/2/2024 - Continue   Pt will improve score on the Oswestry Low Back Pain Questionnaire to </= 38 % disability, demonstrating improved overall function. Goal Not Met 2/2/2024 - Continue     [employer]  Access Code: [email or web address]  URL: [email or web address]  Date: 02/07/2024  Prepared by: Francia Jerome    Exercises  - Seated Slump Neural Tensioner  - 1-2 x daily - 2 sets - 10 reps - 2 hold  - Supine Transversus Abdominis Bracing - Hands on Stomach  - 2 x daily - 1 sets - 30 reps - 5 hold  - Clamshell  - 5 x weekly - 2 sets - 5 reps - 3 hold  - Beginner Bridge  - 5 x weekly - 2 sets - 10 reps - 5 hold  - Sidelying Hip Abduction  - 5 x weekly - 3 sets - 5 reps  - Supine March  - 5 x weekly - 3 sets - 5 reps  - Prone Hip Extension  - 5 x weekly - 2 sets - 10 reps - 2-3 hold

## 2024-02-12 ENCOUNTER — TELEPHONE (OUTPATIENT)
Age: 59
End: 2024-02-12

## 2024-02-12 NOTE — TELEPHONE ENCOUNTER
Pt cancelled < 24 hours for their scheduled therapy appointment today.    Reason: illness  Communication: Pt called to cancel due to illness. Pt hopes to be at appt 2/14/24.

## 2024-02-14 ENCOUNTER — TREATMENT (OUTPATIENT)
Age: 59
End: 2024-02-14
Payer: MEDICAID

## 2024-02-14 DIAGNOSIS — R26.9 ABNORMALITY OF GAIT: Primary | ICD-10-CM

## 2024-02-14 DIAGNOSIS — M25.552 LEFT HIP PAIN: ICD-10-CM

## 2024-02-14 DIAGNOSIS — M70.62 GREATER TROCHANTERIC BURSITIS OF LEFT HIP: ICD-10-CM

## 2024-02-14 DIAGNOSIS — Z74.09 IMPAIRED FUNCTIONAL MOBILITY, BALANCE, GAIT, AND ENDURANCE: ICD-10-CM

## 2024-02-14 DIAGNOSIS — M62.81 MUSCLE WEAKNESS: ICD-10-CM

## 2024-02-14 PROCEDURE — 97110 THERAPEUTIC EXERCISES: CPT | Performed by: PHYSICAL THERAPIST

## 2024-02-14 PROCEDURE — 97140 MANUAL THERAPY 1/> REGIONS: CPT | Performed by: PHYSICAL THERAPIST

## 2024-02-14 NOTE — PROGRESS NOTES
[facility] PT [facility] ORTHOPAEDICS  [address] [facility] [address]  Dept: [phone]      Physical Therapy Daily Note     Referring MD: Mikey Vargas MD   Diagnosis:     ICD-10-CM    1. Abnormality of gait  R26.9       2. Muscle weakness  M62.81       3. Greater trochanteric bursitis of left hip [M70.62]  M70.62       4. Left hip pain  M25.552       5. Impaired functional mobility, balance, gait, and endurance  Z74.09         Therapy precautions:None  Co-morbidities affecting plan of care: s/p L MARTINE 4/5/22, hypertension, OA  Chief complaints/history of injury: Pt underwent L MARTINE 4/5/2022 and as pt recovered she noted new onset L sided low back pain. Pt had home health PT after surgery but did not address back pain. Pt completed PT at this location 6/7/23-8/25/23. Symptoms improved but did not completely resolve. Pt continued her HEP but noted increased low back pain after ending PT but pt cannot recall exactly when it started. She returned to [place] 10/18/23 and was given a steroid injection L hip that did not decrease symptoms.  Describe current symptoms: Central low back pain that radiates into L buttock, hip, and groin.  Pt denies numbness/tingling and saddle paresthesia. Pt with significant weakness LLE and reports cramping both legs at night.   Patient Stated Goals: be able to stand longer     Total Timed Procedure Codes: 53 min, Total Time: 53 min Modifier needed: No  Episode visit count:  15/20 through 2/16/24     SUBJECTIVE     Pt reports LLE continues to feel progressively stronger.     Medications: no changes since last session    OBJECTIVE     Treatment provided today:  Therapeutic exercise (14120) x 38 min to develop ROM, strength, endurance and flexibility trunk and LEs.  Sidelying clamshell 2x10 L   Sidelying L hip abduction 3x5  Supine SLR, hold, bend knee to bring leg down x 5  Prone hip extension 3x5 B  Bent knee fall out 2x10  Beginner bridge w/ adductor squeeze

## 2024-02-16 ENCOUNTER — OFFICE VISIT (OUTPATIENT)
Dept: ORTHOPEDIC SURGERY | Age: 59
End: 2024-02-16
Payer: MEDICAID

## 2024-02-16 VITALS — WEIGHT: 230 LBS | BODY MASS INDEX: 40.75 KG/M2 | HEIGHT: 63 IN

## 2024-02-16 DIAGNOSIS — M54.16 LUMBAR RADICULOPATHY: Primary | ICD-10-CM

## 2024-02-16 PROCEDURE — 99214 OFFICE O/P EST MOD 30 MIN: CPT | Performed by: PHYSICIAN ASSISTANT

## 2024-02-16 NOTE — PROGRESS NOTES
02/16/24        Name: Alma Weir  YOB: 1965  Gender: female  MRN: [id number]    CC: New Patient (Low back pain )       HPI: Alma Weir is a 59 y.o. female who returns for New Patient (Low back pain )         This is established patient of Emanuel Pradhan nurse practitioner at the surgery service at this practice, referred to me for continuity of care.  She recently underwent a left L5/nerve block with Dr. Li.  She had approximately 50% improvement in her pain following the injection.  She continues to have some subjective weakness in the left thigh with hip flexion although that is somewhat improved.  She also has left anterior thigh pain most prominently at this time.  Pain level is up to an 8 out of 10 at times.  Having difficulty cleaning maintaining her home.  She is continued her back exercise program daily since her last injection    History was obtained by patient      Meds/PSH/PMH/FH/SH: This information has been reviewed.      ALLERGIES: No Known Allergies           Physical Examination:    Ambulates with minimal limp    Diminished reflexes at the knees and ankles bilaterally    Sensation tact light touch L3-S1    Full motor on the right with hip flexion, 4+/5 on the left  Full motor on the right with knee extension, 4+/5 on the left  Full motor with great toe extension and ankle dorsiflexion bilaterally        Imaging:         MRI Result (most recent):  MRI LUMBAR SPINE WO CONTRAST 01/03/2024    Narrative  HISTORY: Low back pain radiating into the left buttock, hip, and groin with left  lower extremity weakness    Comparison: December 1, 2023 x-ray      FINDINGS: The vertebral body alignment is maintained.  The vertebral body height  is maintained.  Vertebral body marrow signal is normal.  Intervertebral disc  signal is essentially normal.  The facet articulations are aligned.    The L1-L2 level: There is mild central and right and left paracentral disc  protrusion. There is

## 2024-02-20 ENCOUNTER — TREATMENT (OUTPATIENT)
Age: 59
End: 2024-02-20
Payer: MEDICAID

## 2024-02-20 DIAGNOSIS — Z74.09 IMPAIRED FUNCTIONAL MOBILITY, BALANCE, GAIT, AND ENDURANCE: ICD-10-CM

## 2024-02-20 DIAGNOSIS — M25.552 LEFT HIP PAIN: ICD-10-CM

## 2024-02-20 DIAGNOSIS — M54.50 CHRONIC LEFT-SIDED LOW BACK PAIN WITHOUT SCIATICA: ICD-10-CM

## 2024-02-20 DIAGNOSIS — M70.62 GREATER TROCHANTERIC BURSITIS OF LEFT HIP: ICD-10-CM

## 2024-02-20 DIAGNOSIS — R26.9 ABNORMALITY OF GAIT: Primary | ICD-10-CM

## 2024-02-20 DIAGNOSIS — G89.29 CHRONIC LEFT-SIDED LOW BACK PAIN WITHOUT SCIATICA: ICD-10-CM

## 2024-02-20 DIAGNOSIS — M62.81 MUSCLE WEAKNESS: ICD-10-CM

## 2024-02-20 PROCEDURE — 97012 MECHANICAL TRACTION THERAPY: CPT | Performed by: PHYSICAL THERAPIST

## 2024-02-20 PROCEDURE — 97140 MANUAL THERAPY 1/> REGIONS: CPT | Performed by: PHYSICAL THERAPIST

## 2024-02-20 PROCEDURE — 97110 THERAPEUTIC EXERCISES: CPT | Performed by: PHYSICAL THERAPIST

## 2024-02-20 NOTE — PROGRESS NOTES
strength, endurance and flexibility trunk and LEs.  Upright bike level 3 x 5 min (3 holes showing)  Bent knee fall out 2x10  Hooklying march x 5 L  Sidelying clamshell 2x10 L   Manual therapy (37515) x 10 min utilizing techniques to improve joint and/or soft tissue mobility, ROM, and function as well as helping to decrease pain/spasms and swelling.  Palpation and assessment of soft tissue, muscles, and landmarks   STM L hip flexors/quadriceps- manual and with tennis ball  STM L IT band and glutes in sidelying  Trialed hooklying pelvic traction with belt- decreased pain  Intermittent mechanical traction (01882) x 10 min to lumbar spine to address spinal/nerve/disc compression, improve joint mobility, and/or soft tissue flexibility utilizing [employer] device model TXE-7, serial # [id number].  60 pounds pressure, 45 sec on/15 off    ASSESSMENT     LLE strength  had been improving prior to today. Today, pt unable to complete supine L march or SLR. Post traction, able to march LLE to 90° and complete 6-inch SLR with less pain.     PLAN     Progress hip flexor strengthening as tolerated, continue traction  Interventions may include but are not limited to:   (23970) Therapeutic exercise to develop ROM, strength, endurance and flexibility  (14444) Manual therapy techniques to improve joint and/or soft tissue mobility, ROM, and function as well as helping to decrease pain/spasms and swelling  (72401) Neuromuscular reeducation addressing impaired balance, coordination, kinesthetic sense, posture and proprioception  Home exercise program (HEP) development      GOALS     Short term goals to be met by 1/4/2024  (4 weeks):  Patient will demonstrate good recall of HEP requiring no more than minimal verbal cuing for proper form and technique. Goal Met 1/5/2024   Pt will increase strength to at least 3/5 L hip abduction for improved gait mechanics. Goal Met 2/2/2024   Pt will complete supine L SLR with 6-inches elevation x

## 2024-02-22 ENCOUNTER — TREATMENT (OUTPATIENT)
Age: 59
End: 2024-02-22

## 2024-02-22 DIAGNOSIS — G89.29 CHRONIC LEFT-SIDED LOW BACK PAIN WITHOUT SCIATICA: ICD-10-CM

## 2024-02-22 DIAGNOSIS — M70.62 GREATER TROCHANTERIC BURSITIS OF LEFT HIP: ICD-10-CM

## 2024-02-22 DIAGNOSIS — M54.50 CHRONIC LEFT-SIDED LOW BACK PAIN WITHOUT SCIATICA: ICD-10-CM

## 2024-02-22 DIAGNOSIS — M62.81 MUSCLE WEAKNESS: ICD-10-CM

## 2024-02-22 DIAGNOSIS — Z74.09 IMPAIRED FUNCTIONAL MOBILITY, BALANCE, GAIT, AND ENDURANCE: ICD-10-CM

## 2024-02-22 DIAGNOSIS — R26.9 ABNORMALITY OF GAIT: Primary | ICD-10-CM

## 2024-02-22 DIAGNOSIS — M25.552 LEFT HIP PAIN: ICD-10-CM

## 2024-02-22 NOTE — PROGRESS NOTES
[facility] PT [facility] ORTHOPAEDICS  [address] [facility] [address]  Dept: [phone]      Physical Therapy Daily Note     Referring MD: Mikey Vargas MD   Diagnosis:     ICD-10-CM    1. Abnormality of gait  R26.9       2. Muscle weakness  M62.81       3. Greater trochanteric bursitis of left hip [M70.62]  M70.62       4. Left hip pain  M25.552       5. Impaired functional mobility, balance, gait, and endurance  Z74.09       6. Chronic left-sided low back pain without sciatica [M54.50, G89.29 (ICD-10-CM)]  M54.50     G89.29         Therapy precautions:None  Co-morbidities affecting plan of care: s/p L MARTINE 4/5/22, hypertension, OA  Chief complaints/history of injury: Pt underwent L MARTINE 4/5/2022 and as pt recovered she noted new onset L sided low back pain. Pt had home health PT after surgery but did not address back pain. Pt completed PT at this location 6/7/23-8/25/23. Symptoms improved but did not completely resolve. Pt continued her HEP but noted increased low back pain after ending PT but pt cannot recall exactly when it started. She returned to [place] 10/18/23 and was given a steroid injection L hip that did not decrease symptoms.  Describe current symptoms: Central low back pain that radiates into L buttock, hip, and groin.  Pt denies numbness/tingling and saddle paresthesia. Pt with significant weakness LLE and reports cramping both legs at night.   Patient Stated Goals: be able to stand longer     Total Timed Procedure Codes: 28 min, Total Time: 45 min Modifier needed: No  Episode visit count:  17/35 through 12/31/24    SUBJECTIVE     Pt reports that traction seemed to help decrease pain. She is doing pretty well today and lifting the left leg better.    Medications: no changes since last session    OBJECTIVE     Treatment provided today:  Therapeutic exercise (38005) x 20 min to develop ROM, strength, endurance and flexibility trunk and LEs.  Upright bike level 3 x 5 min (3 holes

## 2024-02-26 ENCOUNTER — TREATMENT (OUTPATIENT)
Age: 59
End: 2024-02-26
Payer: MEDICAID

## 2024-02-26 DIAGNOSIS — G89.29 CHRONIC LEFT-SIDED LOW BACK PAIN WITHOUT SCIATICA: ICD-10-CM

## 2024-02-26 DIAGNOSIS — M62.81 MUSCLE WEAKNESS: ICD-10-CM

## 2024-02-26 DIAGNOSIS — M70.62 GREATER TROCHANTERIC BURSITIS OF LEFT HIP: ICD-10-CM

## 2024-02-26 DIAGNOSIS — R26.9 ABNORMALITY OF GAIT: Primary | ICD-10-CM

## 2024-02-26 DIAGNOSIS — M54.50 LOW BACK PAIN AT MULTIPLE SITES: ICD-10-CM

## 2024-02-26 DIAGNOSIS — M54.50 CHRONIC LEFT-SIDED LOW BACK PAIN WITHOUT SCIATICA: ICD-10-CM

## 2024-02-26 DIAGNOSIS — Z74.09 IMPAIRED FUNCTIONAL MOBILITY, BALANCE, GAIT, AND ENDURANCE: ICD-10-CM

## 2024-02-26 DIAGNOSIS — M25.552 LEFT HIP PAIN: ICD-10-CM

## 2024-02-26 PROCEDURE — 97012 MECHANICAL TRACTION THERAPY: CPT | Performed by: PHYSICAL THERAPIST

## 2024-02-26 PROCEDURE — 97110 THERAPEUTIC EXERCISES: CPT | Performed by: PHYSICAL THERAPIST

## 2024-02-26 PROCEDURE — 97140 MANUAL THERAPY 1/> REGIONS: CPT | Performed by: PHYSICAL THERAPIST

## 2024-02-26 NOTE — PROGRESS NOTES
[facility] PT [facility] ORTHOPAEDICS  [address] [facility] [address]  Dept: [phone]      Physical Therapy Daily Note     Referring MD: Mikey Vargas MD   Diagnosis:     ICD-10-CM    1. Abnormality of gait  R26.9       2. Muscle weakness  M62.81       3. Greater trochanteric bursitis of left hip [M70.62]  M70.62       4. Left hip pain  M25.552       5. Impaired functional mobility, balance, gait, and endurance  Z74.09       6. Chronic left-sided low back pain without sciatica [M54.50, G89.29 (ICD-10-CM)]  M54.50     G89.29       7. Low back pain at multiple sites [M54.50]  M54.50         Therapy precautions:None  Co-morbidities affecting plan of care: s/p L MARTINE 4/5/22, hypertension, OA  Chief complaints/history of injury: Pt underwent L MARTINE 4/5/2022 and as pt recovered she noted new onset L sided low back pain. Pt had home health PT after surgery but did not address back pain. Pt completed PT at this location 6/7/23-8/25/23. Symptoms improved but did not completely resolve. Pt continued her HEP but noted increased low back pain after ending PT but pt cannot recall exactly when it started. She returned to [place] 10/18/23 and was given a steroid injection L hip that did not decrease symptoms.  Describe current symptoms: Central low back pain that radiates into L buttock, hip, and groin.  Pt denies numbness/tingling and saddle paresthesia. Pt with significant weakness LLE and reports cramping both legs at night.   Patient Stated Goals: be able to stand longer     Total Timed Procedure Codes: 45 min, Total Time: 60 min Modifier needed: No  Episode visit count:  18/35 through 12/31/24    SUBJECTIVE     Pt reports that pain is improving and pt is able to lift left leg easier. She notes that she typically has some good days but pain often returns. Pt is now able to stand ~20-25 minutes before having to sit. After sitting for a few minutes she is able to return to standing.     Medications: no changes

## 2024-02-28 ENCOUNTER — OFFICE VISIT (OUTPATIENT)
Dept: ORTHOPEDIC SURGERY | Age: 59
End: 2024-02-28
Payer: MEDICAID

## 2024-02-28 VITALS — HEIGHT: 63 IN | BODY MASS INDEX: 40.75 KG/M2 | WEIGHT: 230 LBS

## 2024-02-28 DIAGNOSIS — M54.16 LUMBAR RADICULOPATHY: Primary | ICD-10-CM

## 2024-02-28 PROCEDURE — 64483 NJX AA&/STRD TFRM EPI L/S 1: CPT | Performed by: PHYSICAL MEDICINE & REHABILITATION

## 2024-02-28 PROCEDURE — 64484 NJX AA&/STRD TFRM EPI L/S EA: CPT | Performed by: PHYSICAL MEDICINE & REHABILITATION

## 2024-02-28 RX ORDER — TRIAMCINOLONE ACETONIDE 40 MG/ML
160 INJECTION, SUSPENSION INTRA-ARTICULAR; INTRAMUSCULAR ONCE
Status: COMPLETED | OUTPATIENT
Start: 2024-02-28 | End: 2024-02-28

## 2024-02-28 RX ADMIN — TRIAMCINOLONE ACETONIDE 160 MG: 40 INJECTION, SUSPENSION INTRA-ARTICULAR; INTRAMUSCULAR at 14:00

## 2024-02-28 NOTE — PROGRESS NOTES
Date: 02/28/24   Name: Alma Weir    Pre-Procedural Diagnosis:    Diagnosis Orders   1. Lumbar radiculopathy  triamcinolone acetonide (KENALOG-40) injection 160 mg    FL NERVE BLOCK LUMBOSACRAL 1ST    FL NERVE BLOCK LUMBOSACRAL EACH ADD          Procedure: Selective Nerve Root Blocks (Transforaminal) - Multiple Level    Precautions:  [facility] Precautions spine injections: None.  Patient denies any prior sensitivity to steroid, local anesthetic, contrast dye, iodine or shellfish.    The procedure was discussed at length with the patient and informed consent was signed. The patient was placed in a prone position on the fluoroscopy table and the skin was prepped and draped in a routine sterile fashion. The areas to be injected were each anesthetized with approximately 5 cc of 1% Lidocaine. A 22-gauge 5 inch spinal needle was carefully advanced under fluoroscopic guidance to the left L4 transforaminal space and subsequently the left L5 transforaminal space. At this time 0.25 cc of omnipaque administered.. Once proper placement was confirmed, 2 cc of 0.25% Marcaine and 80 mg of Kenalog were injected through the spinal needle at each site.     Fluoroscopic guidance was used intermittently over a 10-minute period to insure proper needle placement and patient safety. A hard copy of the fluoroscopic  images has been placed in the patient's chart. The patient was monitored after the procedure and discharged home in stable fashion.     A total of 4 cc of Kenalog were administered during this procedure.    Resume Meds:  N/A    L JERMAINE BUSTILLOS JR, MD  02/28/24

## 2024-03-06 ENCOUNTER — TREATMENT (OUTPATIENT)
Age: 59
End: 2024-03-06

## 2024-03-06 DIAGNOSIS — M62.81 MUSCLE WEAKNESS: ICD-10-CM

## 2024-03-06 DIAGNOSIS — M25.552 LEFT HIP PAIN: ICD-10-CM

## 2024-03-06 DIAGNOSIS — M70.62 GREATER TROCHANTERIC BURSITIS OF LEFT HIP: ICD-10-CM

## 2024-03-06 DIAGNOSIS — R26.9 ABNORMALITY OF GAIT: Primary | ICD-10-CM

## 2024-03-06 DIAGNOSIS — Z74.09 IMPAIRED FUNCTIONAL MOBILITY, BALANCE, GAIT, AND ENDURANCE: ICD-10-CM

## 2024-03-06 NOTE — PROGRESS NOTES
x 5 min (3 holes showing)  Bent knee fall out 2x10  Hooklying march 2x10 R, 1x8 1x4  L  Sidelying clamshell 2x15 B  Prone hip extension 2x10 B  Sidelying hip abduction 2x10 L  Standing hip abduction against red CLX band x10 B  Standing hip flexion against red CLX band x10 B  Manual therapy (97541) x 8 min utilizing techniques to improve joint and/or soft tissue mobility, ROM, and function as well as helping to decrease pain/spasms and swelling.  Palpation and assessment of soft tissue, muscles, and landmarks   STM L hip flexors/quadriceps- manual and with tennis ball  Intermittent mechanical traction (54417) x 15 min to lumbar spine to address spinal/nerve/disc compression, improve joint mobility, and/or soft tissue flexibility utilizing [employer] device model TXE-7, serial # [id number].  70 pounds pressure, 45 sec on/15 off    ASSESSMENT     Pt notes marked decline in anterior L hip pain s/p injection witn continued intermittetn low back pain. LLE strength continues to improve with excellent control during sidelying hip abduction and minimal pain during hooklying march.  Supine SLR remains difficult with anterior hip pain limiting completion.     PLAN     Reassess for discharge vs updated plan of care  Interventions may include but are not limited to:   (57860) Therapeutic exercise to develop ROM, strength, endurance and flexibility  (61249) Manual therapy techniques to improve joint and/or soft tissue mobility, ROM, and function as well as helping to decrease pain/spasms and swelling  (31229) Neuromuscular reeducation addressing impaired balance, coordination, kinesthetic sense, posture and proprioception  Home exercise program (HEP) development      GOALS     Short term goals to be met by 1/4/2024  (4 weeks):  Patient will demonstrate good recall of HEP requiring no more than minimal verbal cuing for proper form and technique. Goal Met 1/5/2024   Pt will increase strength to at least 3/5 L hip abduction

## 2024-03-08 ENCOUNTER — TREATMENT (OUTPATIENT)
Age: 59
End: 2024-03-08

## 2024-03-08 DIAGNOSIS — M25.552 LEFT HIP PAIN: ICD-10-CM

## 2024-03-08 DIAGNOSIS — M62.81 MUSCLE WEAKNESS: ICD-10-CM

## 2024-03-08 DIAGNOSIS — R26.9 ABNORMALITY OF GAIT: Primary | ICD-10-CM

## 2024-03-08 DIAGNOSIS — M70.62 GREATER TROCHANTERIC BURSITIS OF LEFT HIP: ICD-10-CM

## 2024-03-08 DIAGNOSIS — G89.29 CHRONIC LEFT-SIDED LOW BACK PAIN WITHOUT SCIATICA: ICD-10-CM

## 2024-03-08 DIAGNOSIS — Z74.09 IMPAIRED FUNCTIONAL MOBILITY, BALANCE, GAIT, AND ENDURANCE: ICD-10-CM

## 2024-03-08 DIAGNOSIS — M54.50 CHRONIC LEFT-SIDED LOW BACK PAIN WITHOUT SCIATICA: ICD-10-CM

## 2024-03-08 NOTE — PROGRESS NOTES
[facility] PT [facility] ORTHOPAEDICS   [address] [facility] [address]  Dept: [phone]      Physical Therapy Progress Report     Referring MD: Mikey Vargas MD   Diagnosis:     ICD-10-CM    1. Abnormality of gait  R26.9       2. Muscle weakness  M62.81       3. Greater trochanteric bursitis of left hip [M70.62]  M70.62       4. Left hip pain  M25.552       5. Impaired functional mobility, balance, gait, and endurance  Z74.09       6. Chronic left-sided low back pain without sciatica [M54.50, G89.29 (ICD-10-CM)]  M54.50     G89.29           Therapy precautions:None  Co-morbidities affecting plan of care: s/p L MARTINE 4/5/22, hypertension, OA  Chief complaints/history of injury: Pt underwent L MARTINE 4/5/2022 and as pt recovered she noted new onset L sided low back pain. Pt had home health PT after surgery but did not address back pain. Pt completed PT at this location 6/7/23-8/25/23. Symptoms improved but did not completely resolve. Pt continued her HEP but noted increased low back pain after ending PT but pt cannot recall exactly when it started. She returned to [place] 10/18/23 and was given a steroid injection L hip that did not decrease symptoms.  Describe current symptoms: Central low back pain that radiates into L buttock, hip, and groin.  Pt denies numbness/tingling and saddle paresthesia. Pt with significant weakness LLE and reports cramping both legs at night.   Patient Stated Goals: be able to stand longer     Total Timed Procedure Codes: 45 min, Total Time: 60 min Modifier needed: No  Episode visit count:  20/35 through 12/31/24    SUBJECTIVE     Pt notes cramping BLEs and increased generalized joint pain that she attributes to rainy weather. Pt reports increased anterior L hip pain as well today that is limiting lifting the L leg.     Medications: no changes since last session    OBJECTIVE     Functional Outcome Questionnaire: Oswestry Low Back Pain Disability Questionnaire:   12/7/23:

## 2024-04-11 ENCOUNTER — TELEPHONE (OUTPATIENT)
Age: 59
End: 2024-04-11

## 2024-04-22 ENCOUNTER — OFFICE VISIT (OUTPATIENT)
Dept: INTERNAL MEDICINE CLINIC | Facility: CLINIC | Age: 59
End: 2024-04-22
Payer: MEDICAID

## 2024-04-22 VITALS
BODY MASS INDEX: 40.57 KG/M2 | SYSTOLIC BLOOD PRESSURE: 142 MMHG | HEIGHT: 63 IN | OXYGEN SATURATION: 98 % | HEART RATE: 81 BPM | DIASTOLIC BLOOD PRESSURE: 68 MMHG | WEIGHT: 229 LBS

## 2024-04-22 DIAGNOSIS — R73.01 IFG (IMPAIRED FASTING GLUCOSE): ICD-10-CM

## 2024-04-22 DIAGNOSIS — E78.2 MIXED HYPERLIPIDEMIA: Primary | ICD-10-CM

## 2024-04-22 DIAGNOSIS — M79.10 MYALGIA: ICD-10-CM

## 2024-04-22 DIAGNOSIS — I10 PRIMARY HYPERTENSION: ICD-10-CM

## 2024-04-22 LAB
ALBUMIN SERPL-MCNC: 3.6 G/DL (ref 3.5–5)
ALBUMIN/GLOB SERPL: 1 (ref 0.4–1.6)
ALP SERPL-CCNC: 137 U/L (ref 50–136)
ALT SERPL-CCNC: 14 U/L (ref 12–65)
ANION GAP SERPL CALC-SCNC: 3 MMOL/L (ref 2–11)
AST SERPL-CCNC: 11 U/L (ref 15–37)
BASOPHILS # BLD: 0 K/UL (ref 0–0.2)
BASOPHILS NFR BLD: 1 % (ref 0–2)
BILIRUB SERPL-MCNC: 0.5 MG/DL (ref 0.2–1.1)
BUN SERPL-MCNC: 24 MG/DL (ref 6–23)
CALCIUM SERPL-MCNC: 9.2 MG/DL (ref 8.3–10.4)
CHLORIDE SERPL-SCNC: 107 MMOL/L (ref 103–113)
CHOLEST SERPL-MCNC: 166 MG/DL
CO2 SERPL-SCNC: 28 MMOL/L (ref 21–32)
CREAT SERPL-MCNC: 2.3 MG/DL (ref 0.6–1)
CREAT UR-MCNC: 281 MG/DL
DIFFERENTIAL METHOD BLD: ABNORMAL
EOSINOPHIL # BLD: 0.1 K/UL (ref 0–0.8)
EOSINOPHIL NFR BLD: 1 % (ref 0.5–7.8)
ERYTHROCYTE [DISTWIDTH] IN BLOOD BY AUTOMATED COUNT: 13.6 % (ref 11.9–14.6)
GLOBULIN SER CALC-MCNC: 3.6 G/DL (ref 2.8–4.5)
GLUCOSE SERPL-MCNC: 83 MG/DL (ref 65–100)
HCT VFR BLD AUTO: 40.3 % (ref 35.8–46.3)
HDLC SERPL-MCNC: 61 MG/DL (ref 40–60)
HDLC SERPL: 2.7
HGB BLD-MCNC: 12.6 G/DL (ref 11.7–15.4)
IMM GRANULOCYTES # BLD AUTO: 0 K/UL (ref 0–0.5)
IMM GRANULOCYTES NFR BLD AUTO: 0 % (ref 0–5)
LDLC SERPL CALC-MCNC: 90.8 MG/DL
LYMPHOCYTES # BLD: 2 K/UL (ref 0.5–4.6)
LYMPHOCYTES NFR BLD: 31 % (ref 13–44)
MCH RBC QN AUTO: 29.9 PG (ref 26.1–32.9)
MCHC RBC AUTO-ENTMCNC: 31.3 G/DL (ref 31.4–35)
MCV RBC AUTO: 95.7 FL (ref 82–102)
MICROALBUMIN UR-MCNC: 1.61 MG/DL
MICROALBUMIN/CREAT UR-RTO: 6 MG/G (ref 0–30)
MONOCYTES # BLD: 0.6 K/UL (ref 0.1–1.3)
MONOCYTES NFR BLD: 10 % (ref 4–12)
NEUTS SEG # BLD: 3.5 K/UL (ref 1.7–8.2)
NEUTS SEG NFR BLD: 57 % (ref 43–78)
NRBC # BLD: 0 K/UL (ref 0–0.2)
PLATELET # BLD AUTO: 323 K/UL (ref 150–450)
PMV BLD AUTO: 10.3 FL (ref 9.4–12.3)
POTASSIUM SERPL-SCNC: 3.8 MMOL/L (ref 3.5–5.1)
PROT SERPL-MCNC: 7.2 G/DL (ref 6.3–8.2)
RBC # BLD AUTO: 4.21 M/UL (ref 4.05–5.2)
SODIUM SERPL-SCNC: 138 MMOL/L (ref 136–146)
TRIGL SERPL-MCNC: 71 MG/DL (ref 35–150)
TSH, 3RD GENERATION: 0.98 UIU/ML (ref 0.36–3.74)
VLDLC SERPL CALC-MCNC: 14.2 MG/DL (ref 6–23)
WBC # BLD AUTO: 6.2 K/UL (ref 4.3–11.1)

## 2024-04-22 PROCEDURE — 3077F SYST BP >= 140 MM HG: CPT | Performed by: INTERNAL MEDICINE

## 2024-04-22 PROCEDURE — 99214 OFFICE O/P EST MOD 30 MIN: CPT | Performed by: INTERNAL MEDICINE

## 2024-04-22 PROCEDURE — 3078F DIAST BP <80 MM HG: CPT | Performed by: INTERNAL MEDICINE

## 2024-04-22 ASSESSMENT — ENCOUNTER SYMPTOMS
VOMITING: 0
DIARRHEA: 0
SHORTNESS OF BREATH: 0
CONSTIPATION: 0
ABDOMINAL PAIN: 0
WHEEZING: 0
NAUSEA: 0
SINUS PAIN: 0

## 2024-04-22 NOTE — PROGRESS NOTES
Left lower leg: No edema.   Skin:     General: Skin is warm.      Coloration: Skin is not jaundiced.   Neurological:      General: No focal deficit present.      Mental Status: She is alert. Mental status is at baseline.   Psychiatric:         Mood and Affect: Mood normal.         Behavior: Behavior is cooperative.         Thought Content: Thought content normal.            Alma was seen today for cholesterol problem and hypertension.    Diagnoses and all orders for this visit:    Mixed hyperlipidemia  -     TSH; Future  -     Comprehensive Metabolic Panel; Future  -     CBC; Future  -     Hemoglobin A1C; Future  -     Lipid Panel; Future  -     Hemoglobin A1C  -     Microalbumin / Creatinine Urine Ratio  -     TSH  -     Comprehensive Metabolic Panel  -     CBC with Auto Differential  -     Lipid Panel    Primary hypertension  -     TSH; Future  -     Comprehensive Metabolic Panel; Future  -     CBC; Future  -     Hemoglobin A1C; Future  -     Lipid Panel; Future  -     Microalbumin / Creatinine Urine Ratio; Future  -     Hemoglobin A1C  -     Microalbumin / Creatinine Urine Ratio  -     TSH  -     Comprehensive Metabolic Panel  -     CBC with Auto Differential  -     Lipid Panel    IFG (impaired fasting glucose)  -     TSH; Future  -     Comprehensive Metabolic Panel; Future  -     CBC; Future  -     Hemoglobin A1C; Future  -     Lipid Panel; Future  -     Hemoglobin A1C  -     Microalbumin / Creatinine Urine Ratio  -     TSH  -     Comprehensive Metabolic Panel  -     CBC with Auto Differential  -     Lipid Panel    Myalgia  -     TSH; Future  -     Comprehensive Metabolic Panel; Future  -     CBC; Future  -     Hemoglobin A1C; Future  -     Lipid Panel; Future  -     Magnesium; Future                 Rafael Clifton DO

## 2024-04-23 LAB
EST. AVERAGE GLUCOSE BLD GHB EST-MCNC: 94 MG/DL
HBA1C MFR BLD: 4.9 % (ref 4.8–5.6)

## 2024-06-04 ENCOUNTER — TELEPHONE (OUTPATIENT)
Age: 59
End: 2024-06-04

## 2024-06-04 NOTE — TELEPHONE ENCOUNTER
Pt left voicemail 6/3/24 requesting return call. Called and left voicemail 6/4/24. Will follow up tomorrow if pt has not called back.

## 2024-06-11 ENCOUNTER — TELEPHONE (OUTPATIENT)
Dept: ORTHOPEDIC SURGERY | Age: 59
End: 2024-06-11

## 2024-06-11 DIAGNOSIS — M70.62 GREATER TROCHANTERIC BURSITIS OF LEFT HIP: ICD-10-CM

## 2024-06-11 DIAGNOSIS — Z96.642 PRESENCE OF LEFT ARTIFICIAL HIP JOINT: ICD-10-CM

## 2024-06-11 DIAGNOSIS — M54.16 LUMBAR RADICULOPATHY: Primary | ICD-10-CM

## 2024-07-01 ENCOUNTER — TELEPHONE (OUTPATIENT)
Age: 59
End: 2024-07-01

## 2024-07-01 NOTE — TELEPHONE ENCOUNTER
Pt cancelled < 24 hours for their scheduled therapy appointment today.    Reason: illness  Communication: Pt called and rescheduled for 7/3/24 at 1:25 pm.

## 2024-07-03 ENCOUNTER — TELEPHONE (OUTPATIENT)
Age: 59
End: 2024-07-03

## 2024-07-03 NOTE — TELEPHONE ENCOUNTER
Pt cancelled > 24 hours for their scheduled therapy appointment today.    Reason: illness  Communication: Pt left voicemail 7/2/24 cancelling appt due to continued illness. Left pt a voicemail that appt I rescheduled to 7/15/24 at 9:05 am and asked pt to return call to confirm.

## 2024-07-15 ENCOUNTER — EVALUATION (OUTPATIENT)
Age: 59
End: 2024-07-15
Payer: MEDICAID

## 2024-07-15 DIAGNOSIS — M62.81 MUSCLE WEAKNESS: ICD-10-CM

## 2024-07-15 DIAGNOSIS — Z74.09 IMPAIRED FUNCTIONAL MOBILITY, BALANCE, GAIT, AND ENDURANCE: ICD-10-CM

## 2024-07-15 DIAGNOSIS — M54.50 CHRONIC BILATERAL LOW BACK PAIN WITHOUT SCIATICA: ICD-10-CM

## 2024-07-15 DIAGNOSIS — M25.552 LEFT HIP PAIN: ICD-10-CM

## 2024-07-15 DIAGNOSIS — G89.29 CHRONIC BILATERAL LOW BACK PAIN WITHOUT SCIATICA: ICD-10-CM

## 2024-07-15 DIAGNOSIS — M54.16 LUMBAR RADICULOPATHY: ICD-10-CM

## 2024-07-15 DIAGNOSIS — R26.9 ABNORMALITY OF GAIT: Primary | ICD-10-CM

## 2024-07-15 DIAGNOSIS — M70.62 GREATER TROCHANTERIC BURSITIS OF LEFT HIP: ICD-10-CM

## 2024-07-15 DIAGNOSIS — Z96.642 PRESENCE OF LEFT ARTIFICIAL HIP JOINT: ICD-10-CM

## 2024-07-15 PROCEDURE — 97162 PT EVAL MOD COMPLEX 30 MIN: CPT | Performed by: PHYSICAL THERAPIST

## 2024-07-15 PROCEDURE — 97110 THERAPEUTIC EXERCISES: CPT | Performed by: PHYSICAL THERAPIST

## 2024-07-17 ENCOUNTER — TREATMENT (OUTPATIENT)
Age: 59
End: 2024-07-17
Payer: MEDICAID

## 2024-07-17 DIAGNOSIS — M54.50 CHRONIC BILATERAL LOW BACK PAIN WITHOUT SCIATICA: ICD-10-CM

## 2024-07-17 DIAGNOSIS — Z74.09 IMPAIRED FUNCTIONAL MOBILITY, BALANCE, GAIT, AND ENDURANCE: ICD-10-CM

## 2024-07-17 DIAGNOSIS — M70.62 GREATER TROCHANTERIC BURSITIS OF LEFT HIP: ICD-10-CM

## 2024-07-17 DIAGNOSIS — R26.9 ABNORMALITY OF GAIT: Primary | ICD-10-CM

## 2024-07-17 DIAGNOSIS — M62.81 MUSCLE WEAKNESS: ICD-10-CM

## 2024-07-17 DIAGNOSIS — M25.552 LEFT HIP PAIN: ICD-10-CM

## 2024-07-17 DIAGNOSIS — G89.29 CHRONIC BILATERAL LOW BACK PAIN WITHOUT SCIATICA: ICD-10-CM

## 2024-07-17 PROCEDURE — 97110 THERAPEUTIC EXERCISES: CPT | Performed by: PHYSICAL THERAPIST

## 2024-07-17 PROCEDURE — 97012 MECHANICAL TRACTION THERAPY: CPT | Performed by: PHYSICAL THERAPIST

## 2024-07-17 NOTE — PROGRESS NOTES
[facility] PT INT [facility] ORTHOPAEDICS  [address] [facility] [address]  Dept: [phone]      Physical Therapy Daily Note     Referring MD: Mikey Vargas MD  Diagnosis:     ICD-10-CM    1. Abnormality of gait  R26.9       2. Muscle weakness  M62.81       3. Greater trochanteric bursitis of left hip [M70.62]  M70.62       4. Left hip pain  M25.552       5. Impaired functional mobility, balance, gait, and endurance  Z74.09       6. Chronic bilateral low back pain without sciatica  M54.50     G89.29          Therapy precautions:None  Co-morbidities affecting plan of care: s/p L MARTINE 4/5/22, hypertension, OA   Chief complaints/history of injury:  Pt underwent L MARTINE 4/5/2022 and as pt recovered she noted new onset L sided low back pain. Pt had home health PT after surgery but did not address back pain. Pt completed PT at this location 6/7/23-8/25/23. Symptoms improved but did not completely resolve. She returned for PT 12/7/23-3/8/24 and also had 2 lumbar injections. Pt was discharged with a home program.  Shots did not seem to help. Pt has continued her home program daily. Pt notes that she starts to feel better when she comes to therapy and then symptoms return. Pt returns to therapy with chief complaint of pain across low back. L hip is improving but .   Describe current symptoms: Pain across low back that radiates into L buttock, tenderness lateral L hip. Numbness entire L leg only with extended sitting (30-60 min). Pt has good and bad days.   Patient Stated Goals: get better    Payor: Payor: [employer] [place] /  /  /  Billing pattern: Government- total time    Total Timed Procedure Codes: 40 min, Total Time: 60 min Modifier needed: No  Episode visit count:  2     SUBJECTIVE     Pt reports that traction really helped when last in therapy and she would like to resume. Pt reports that back feels a little better today than yesterday. Hip remains sore.     Medications: no changes since

## 2024-07-22 ENCOUNTER — TREATMENT (OUTPATIENT)
Age: 59
End: 2024-07-22
Payer: MEDICAID

## 2024-07-22 DIAGNOSIS — M70.62 GREATER TROCHANTERIC BURSITIS OF LEFT HIP: ICD-10-CM

## 2024-07-22 DIAGNOSIS — M54.50 CHRONIC BILATERAL LOW BACK PAIN WITHOUT SCIATICA: ICD-10-CM

## 2024-07-22 DIAGNOSIS — M62.81 MUSCLE WEAKNESS: ICD-10-CM

## 2024-07-22 DIAGNOSIS — Z74.09 IMPAIRED FUNCTIONAL MOBILITY, BALANCE, GAIT, AND ENDURANCE: ICD-10-CM

## 2024-07-22 DIAGNOSIS — M54.16 LUMBAR RADICULOPATHY: ICD-10-CM

## 2024-07-22 DIAGNOSIS — R26.9 ABNORMALITY OF GAIT: Primary | ICD-10-CM

## 2024-07-22 DIAGNOSIS — G89.29 CHRONIC BILATERAL LOW BACK PAIN WITHOUT SCIATICA: ICD-10-CM

## 2024-07-22 DIAGNOSIS — M25.552 LEFT HIP PAIN: ICD-10-CM

## 2024-07-22 PROCEDURE — 97012 MECHANICAL TRACTION THERAPY: CPT | Performed by: PHYSICAL THERAPIST

## 2024-07-22 PROCEDURE — 97110 THERAPEUTIC EXERCISES: CPT | Performed by: PHYSICAL THERAPIST

## 2024-07-22 NOTE — PROGRESS NOTES
[facility] PT INT [facility] ORTHOPAEDICS  [address] [facility] [address]  Dept: [phone]      Physical Therapy Daily Note     Referring MD: Mikey Vargas MD  Diagnosis:     ICD-10-CM    1. Abnormality of gait  R26.9       2. Muscle weakness  M62.81       3. Greater trochanteric bursitis of left hip [M70.62]  M70.62       4. Left hip pain  M25.552       5. Impaired functional mobility, balance, gait, and endurance  Z74.09       6. Chronic bilateral low back pain without sciatica  M54.50     G89.29       7. Lumbar radiculopathy [M54.16]  M54.16          Therapy precautions:None  Co-morbidities affecting plan of care: s/p L MARTINE 4/5/22, hypertension, OA   Chief complaints/history of injury:  Pt underwent L MARTINE 4/5/2022 and as pt recovered she noted new onset L sided low back pain. Pt had home health PT after surgery but did not address back pain. Pt completed PT at this location 6/7/23-8/25/23. Symptoms improved but did not completely resolve. She returned for PT 12/7/23-3/8/24 and also had 2 lumbar injections. Pt was discharged with a home program.  Shots did not seem to help. Pt has continued her home program daily. Pt notes that she starts to feel better when she comes to therapy and then symptoms return. Pt returns to therapy with chief complaint of pain across low back. L hip is improving but .   Describe current symptoms: Pain across low back that radiates into L buttock, tenderness lateral L hip. Numbness entire L leg only with extended sitting (30-60 min). Pt has good and bad days.   Patient Stated Goals: get better    Payor: Payor: [employer] [place] /  /  /  Billing pattern: Government- total time    Total Timed Procedure Codes: 38 min, Total Time: 60 min Modifier needed: No  Episode visit count:  3/20 approved through 9/27/24     SUBJECTIVE     Pt reports that traction seemed to help and she was able to lift her L leg higher at home after last session. Pt was able to stand

## 2024-07-24 ENCOUNTER — TREATMENT (OUTPATIENT)
Age: 59
End: 2024-07-24
Payer: MEDICAID

## 2024-07-24 DIAGNOSIS — M54.50 CHRONIC BILATERAL LOW BACK PAIN WITHOUT SCIATICA: ICD-10-CM

## 2024-07-24 DIAGNOSIS — M70.62 GREATER TROCHANTERIC BURSITIS OF LEFT HIP: ICD-10-CM

## 2024-07-24 DIAGNOSIS — G89.29 CHRONIC BILATERAL LOW BACK PAIN WITHOUT SCIATICA: ICD-10-CM

## 2024-07-24 DIAGNOSIS — M25.552 LEFT HIP PAIN: ICD-10-CM

## 2024-07-24 DIAGNOSIS — M62.81 MUSCLE WEAKNESS: ICD-10-CM

## 2024-07-24 DIAGNOSIS — R26.9 ABNORMALITY OF GAIT: Primary | ICD-10-CM

## 2024-07-24 DIAGNOSIS — Z74.09 IMPAIRED FUNCTIONAL MOBILITY, BALANCE, GAIT, AND ENDURANCE: ICD-10-CM

## 2024-07-24 PROCEDURE — 97110 THERAPEUTIC EXERCISES: CPT | Performed by: PHYSICAL THERAPIST

## 2024-07-24 PROCEDURE — 97012 MECHANICAL TRACTION THERAPY: CPT | Performed by: PHYSICAL THERAPIST

## 2024-07-24 NOTE — PROGRESS NOTES
[facility] PT INT [facility] ORTHOPAEDICS  [address] [facility] [address]  Dept: [phone]      Physical Therapy Daily Note     Referring MD: Mikey Vargas MD  Diagnosis:     ICD-10-CM    1. Abnormality of gait  R26.9       2. Muscle weakness  M62.81       3. Greater trochanteric bursitis of left hip [M70.62]  M70.62       4. Left hip pain  M25.552       5. Impaired functional mobility, balance, gait, and endurance  Z74.09       6. Chronic bilateral low back pain without sciatica  M54.50     G89.29          Therapy precautions:None  Co-morbidities affecting plan of care: s/p L MARTINE 4/5/22, hypertension, OA   Chief complaints/history of injury:  Pt underwent L MARTINE 4/5/2022 and as pt recovered she noted new onset L sided low back pain. Pt had home health PT after surgery but did not address back pain. Pt completed PT at this location 6/7/23-8/25/23. Symptoms improved but did not completely resolve. She returned for PT 12/7/23-3/8/24 and also had 2 lumbar injections. Pt was discharged with a home program.  Shots did not seem to help. Pt has continued her home program daily. Pt notes that she starts to feel better when she comes to therapy and then symptoms return. Pt returns to therapy with chief complaint of pain across low back. L hip is improving but .   Describe current symptoms: Pain across low back that radiates into L buttock, tenderness lateral L hip. Numbness entire L leg only with extended sitting (30-60 min). Pt has good and bad days.   Patient Stated Goals: get better    Payor: Payor: [employer] [place] /  /  /  Billing pattern: Government- total time    Total Timed Procedure Codes: 40 min, Total Time: 60 min Modifier needed: No  Episode visit count:  4/20 approved through 9/27/24     SUBJECTIVE     Pt tried to place her L foot on toilet seat this am when getting out of the shower and stubbed her toe. Toe is painful and pt wearing slides instead of sneakers for comfort. Pt

## 2024-07-30 ENCOUNTER — TREATMENT (OUTPATIENT)
Age: 59
End: 2024-07-30
Payer: MEDICAID

## 2024-07-30 DIAGNOSIS — M70.62 GREATER TROCHANTERIC BURSITIS OF LEFT HIP: ICD-10-CM

## 2024-07-30 DIAGNOSIS — M54.50 CHRONIC BILATERAL LOW BACK PAIN WITHOUT SCIATICA: ICD-10-CM

## 2024-07-30 DIAGNOSIS — M25.552 LEFT HIP PAIN: ICD-10-CM

## 2024-07-30 DIAGNOSIS — M62.81 MUSCLE WEAKNESS: ICD-10-CM

## 2024-07-30 DIAGNOSIS — G89.29 CHRONIC BILATERAL LOW BACK PAIN WITHOUT SCIATICA: ICD-10-CM

## 2024-07-30 DIAGNOSIS — Z74.09 IMPAIRED FUNCTIONAL MOBILITY, BALANCE, GAIT, AND ENDURANCE: ICD-10-CM

## 2024-07-30 DIAGNOSIS — R26.9 ABNORMALITY OF GAIT: Primary | ICD-10-CM

## 2024-07-30 PROCEDURE — 97110 THERAPEUTIC EXERCISES: CPT | Performed by: PHYSICAL THERAPIST

## 2024-07-30 PROCEDURE — 97012 MECHANICAL TRACTION THERAPY: CPT | Performed by: PHYSICAL THERAPIST

## 2024-07-30 NOTE — PROGRESS NOTES
[facility] PT INT [facility] ORTHOPAEDICS  [address] [facility] [address]  Dept: [phone]      Physical Therapy Daily Note     Referring MD: Mikey Vargas MD  Diagnosis:     ICD-10-CM    1. Abnormality of gait  R26.9       2. Muscle weakness  M62.81       3. Greater trochanteric bursitis of left hip [M70.62]  M70.62       4. Left hip pain  M25.552       5. Impaired functional mobility, balance, gait, and endurance  Z74.09       6. Chronic bilateral low back pain without sciatica  M54.50     G89.29            Therapy precautions:None  Co-morbidities affecting plan of care: s/p L MARTINE 4/5/22, hypertension, OA   Chief complaints/history of injury:  Pt underwent L MARTINE 4/5/2022 and as pt recovered she noted new onset L sided low back pain. Pt had home health PT after surgery but did not address back pain. Pt completed PT at this location 6/7/23-8/25/23. Symptoms improved but did not completely resolve. She returned for PT 12/7/23-3/8/24 and also had 2 lumbar injections. Pt was discharged with a home program.  Shots did not seem to help. Pt has continued her home program daily. Pt notes that she starts to feel better when she comes to therapy and then symptoms return. Pt returns to therapy with chief complaint of pain across low back. L hip is improving but .   Describe current symptoms: Pain across low back that radiates into L buttock, tenderness lateral L hip. Numbness entire L leg only with extended sitting (30-60 min). Pt has good and bad days.   Patient Stated Goals: get better    Payor: Payor[place] [employer] [place] /  /  /  Billing pattern: Government- total time    In 1023  Out 1130  Total Timed Procedure Codes: 45 min, Total Time: 67 min Modifier needed: No  Episode visit count:  5/20 approved through 9/27/24     SUBJECTIVE     Pt     Medications: no changes since last session    OBJECTIVE     Pt able to transition sit edge of bed to supine without using hands to lift left leg, able

## 2024-07-31 NOTE — PROGRESS NOTES
[facility] PT INT [facility] ORTHOPAEDICS  [address] [facility] [address]  Dept: [phone]      Physical Therapy Daily Note     Referring MD: Mikey Vargas MD  Diagnosis:     ICD-10-CM    1. Abnormality of gait  R26.9       2. Muscle weakness  M62.81       3. Greater trochanteric bursitis of left hip [M70.62]  M70.62       4. Left hip pain  M25.552       5. Impaired functional mobility, balance, gait, and endurance  Z74.09       6. Chronic bilateral low back pain without sciatica  M54.50     G89.29              Therapy precautions:None  Co-morbidities affecting plan of care: s/p L MARTINE 4/5/22, hypertension, OA   Chief complaints/history of injury:  Pt underwent L MARTINE 4/5/2022 and as pt recovered she noted new onset L sided low back pain. Pt had home health PT after surgery but did not address back pain. Pt completed PT at this location 6/7/23-8/25/23. Symptoms improved but did not completely resolve. She returned for PT 12/7/23-3/8/24 and also had 2 lumbar injections. Pt was discharged with a home program.  Shots did not seem to help. Pt has continued her home program daily. Pt notes that she starts to feel better when she comes to therapy and then symptoms return. Pt returns to therapy with chief complaint of pain across low back. L hip is improving but .   Describe current symptoms: Pain across low back that radiates into L buttock, tenderness lateral L hip. Numbness entire L leg only with extended sitting (30-60 min). Pt has good and bad days.   Patient Stated Goals: get better    Payor: Payor[place] [employer] [place] /  /  /  Billing pattern: Government- total time    In 11:11  Out 12:00pm  Total Timed Procedure Codes: 48 min, Total Time: 49 min Modifier needed: No  Episode visit count:  6/20 approved through 9/27/24     SUBJECTIVE     Pt states that she is \"doing pretty good today.\"   She says that she does not want to have traction today because \"I am sleepy and I want to fall asleep

## 2024-08-01 ENCOUNTER — TREATMENT (OUTPATIENT)
Age: 59
End: 2024-08-01
Payer: MEDICAID

## 2024-08-01 DIAGNOSIS — G89.29 CHRONIC BILATERAL LOW BACK PAIN WITHOUT SCIATICA: ICD-10-CM

## 2024-08-01 DIAGNOSIS — M54.50 CHRONIC BILATERAL LOW BACK PAIN WITHOUT SCIATICA: ICD-10-CM

## 2024-08-01 DIAGNOSIS — M25.552 LEFT HIP PAIN: ICD-10-CM

## 2024-08-01 DIAGNOSIS — M70.62 GREATER TROCHANTERIC BURSITIS OF LEFT HIP: ICD-10-CM

## 2024-08-01 DIAGNOSIS — R26.9 ABNORMALITY OF GAIT: Primary | ICD-10-CM

## 2024-08-01 DIAGNOSIS — M62.81 MUSCLE WEAKNESS: ICD-10-CM

## 2024-08-01 DIAGNOSIS — Z74.09 IMPAIRED FUNCTIONAL MOBILITY, BALANCE, GAIT, AND ENDURANCE: ICD-10-CM

## 2024-08-01 PROCEDURE — 97110 THERAPEUTIC EXERCISES: CPT | Performed by: PHYSICAL THERAPIST

## 2024-08-08 ENCOUNTER — TREATMENT (OUTPATIENT)
Age: 59
End: 2024-08-08
Payer: MEDICAID

## 2024-08-08 DIAGNOSIS — M62.81 MUSCLE WEAKNESS: ICD-10-CM

## 2024-08-08 DIAGNOSIS — M25.552 LEFT HIP PAIN: ICD-10-CM

## 2024-08-08 DIAGNOSIS — G89.29 CHRONIC BILATERAL LOW BACK PAIN WITHOUT SCIATICA: ICD-10-CM

## 2024-08-08 DIAGNOSIS — M70.62 GREATER TROCHANTERIC BURSITIS OF LEFT HIP: ICD-10-CM

## 2024-08-08 DIAGNOSIS — Z74.09 IMPAIRED FUNCTIONAL MOBILITY, BALANCE, GAIT, AND ENDURANCE: ICD-10-CM

## 2024-08-08 DIAGNOSIS — M54.50 CHRONIC BILATERAL LOW BACK PAIN WITHOUT SCIATICA: ICD-10-CM

## 2024-08-08 DIAGNOSIS — R26.9 ABNORMALITY OF GAIT: Primary | ICD-10-CM

## 2024-08-08 DIAGNOSIS — M54.16 LUMBAR RADICULOPATHY: ICD-10-CM

## 2024-08-08 PROCEDURE — 97110 THERAPEUTIC EXERCISES: CPT | Performed by: PHYSICAL THERAPIST

## 2024-08-08 PROCEDURE — 97140 MANUAL THERAPY 1/> REGIONS: CPT | Performed by: PHYSICAL THERAPIST

## 2024-08-08 NOTE — PROGRESS NOTES
utilizing techniques to improve joint and/or soft tissue mobility, ROM, and function as well as helping to decrease pain/spasms and swelling.  Palpation and assessment of soft tissue, muscles, and landmarks    STM B lumbar/thoracic paraspinals in prone    ASSESSMENT     Traction held due to exacerbation of low back pain and muscle tightness. Pt with increased difficulty/pain during bed mobility and lifting L leg this session. Education provided to patient regarding exacerbations and importance of still attending PT so that we can address pain and allow for quicker return to activity.    PLAN   On next visit: assess effectiveness of last treatment  Continue core strengthening exercises and activities  Continue with lumbar traction    Effective Dates/Duration: 7/15/2024 TO 9/13/2024 (60 days).    Frequency: 2x/week   Interventions may include but are not limited to:   (56240) Therapeutic exercise to develop ROM, strength, endurance and flexibility  (95000) Therapeutic activities using dynamic activities to improve function  (07674) Manual therapy techniques to improve joint and/or soft tissue mobility, ROM, and function as well as helping to decrease pain/spasms and swelling  (06102) Neuromuscular reeducation addressing impaired balance, coordination, kinesthetic sense, posture and proprioception  (06055) Mechanical traction to address spinal/nerve/disc compression, improve joint mobility, and/or soft tissue flexibility  Home exercise program (HEP) development      GOALS     Short term goals to be met by 8/12/2024  (4 weeks):  Patient will demonstrate good recall of HEP requiring no more than minimal verbal cuing for proper form and technique.  Pt will increase lower abdominal strength to at least 3/5 for improved spinal stabilization with functional activity.  Pt will increase strength to at least 3/5 in order to improve gait stability.  Pt will improve score on the Oswestry Low Back Pain Questionnaire to </= 42 %

## 2024-08-08 NOTE — PROGRESS NOTES
lumbar spine to address spinal/nerve/disc compression, improve joint mobility, and/or soft tissue flexibility utilizing [employer] device model TXE-7, serial # [id number].  80 pounds pressure max/65 pounds pressure min, 45 sec on/15 off , 1 step up/down, 100% rope speed.     ASSESSMENT     Pt was able to walk, make transitions and perform exercises with greater ease, increased speed, and with more control than on her last visit. She is still very weak in her R LE  and core and would  recommend continuing  strengthening exercises to stabilize hip and back.    PLAN   On next visit: assess effectiveness of last treatment  Continue core strengthening exercises and activities  Continue with lumbar traction    Effective Dates/Duration: 7/15/2024 TO 9/13/2024 (60 days).    Frequency: 2x/week   Interventions may include but are not limited to:   (27146) Therapeutic exercise to develop ROM, strength, endurance and flexibility  (13349) Therapeutic activities using dynamic activities to improve function  (61456) Manual therapy techniques to improve joint and/or soft tissue mobility, ROM, and function as well as helping to decrease pain/spasms and swelling  (83273) Neuromuscular reeducation addressing impaired balance, coordination, kinesthetic sense, posture and proprioception  (10544) Mechanical traction to address spinal/nerve/disc compression, improve joint mobility, and/or soft tissue flexibility  Home exercise program (HEP) development      GOALS     Short term goals to be met by 8/12/2024  (4 weeks):  Patient will demonstrate good recall of HEP requiring no more than minimal verbal cuing for proper form and technique.  Pt will increase lower abdominal strength to at least 3/5 for improved spinal stabilization with functional activity.  Pt will increase strength to at least 3/5 in order to improve gait stability.  Pt will improve score on the Oswestry Low Back Pain Questionnaire to </= 42 % disability, demonstrating

## 2024-08-16 ENCOUNTER — TELEPHONE (OUTPATIENT)
Age: 59
End: 2024-08-16

## 2024-08-16 NOTE — TELEPHONE ENCOUNTER
Pt did not show for their scheduled therapy appointment today.    Reason: unknown  Communication: left voicemail for pt requesting call back

## 2024-08-21 ENCOUNTER — TELEPHONE (OUTPATIENT)
Age: 59
End: 2024-08-21

## 2024-08-21 NOTE — TELEPHONE ENCOUNTER
Pt did not show for their scheduled therapy appointment today.    Reason: unknown  Communication: called and left voicemail requesting pt call

## 2024-08-23 ENCOUNTER — TREATMENT (OUTPATIENT)
Age: 59
End: 2024-08-23

## 2024-08-23 DIAGNOSIS — Z74.09 IMPAIRED FUNCTIONAL MOBILITY, BALANCE, GAIT, AND ENDURANCE: ICD-10-CM

## 2024-08-23 DIAGNOSIS — M62.81 MUSCLE WEAKNESS: ICD-10-CM

## 2024-08-23 DIAGNOSIS — R26.9 ABNORMALITY OF GAIT: Primary | ICD-10-CM

## 2024-08-23 DIAGNOSIS — M25.552 LEFT HIP PAIN: ICD-10-CM

## 2024-08-23 DIAGNOSIS — M70.62 GREATER TROCHANTERIC BURSITIS OF LEFT HIP: ICD-10-CM

## 2024-08-23 NOTE — PROGRESS NOTES
Single Leg Hip Abduction with Alternating Legs and Resistance at Ankles  - 5 x weekly - 2 sets - 10 reps

## 2024-08-29 ENCOUNTER — TREATMENT (OUTPATIENT)
Age: 59
End: 2024-08-29
Payer: MEDICAID

## 2024-08-29 DIAGNOSIS — M62.81 MUSCLE WEAKNESS: ICD-10-CM

## 2024-08-29 DIAGNOSIS — M25.552 LEFT HIP PAIN: ICD-10-CM

## 2024-08-29 DIAGNOSIS — M54.50 CHRONIC BILATERAL LOW BACK PAIN WITHOUT SCIATICA: ICD-10-CM

## 2024-08-29 DIAGNOSIS — R26.9 ABNORMALITY OF GAIT: Primary | ICD-10-CM

## 2024-08-29 DIAGNOSIS — M70.62 GREATER TROCHANTERIC BURSITIS OF LEFT HIP: ICD-10-CM

## 2024-08-29 DIAGNOSIS — Z74.09 IMPAIRED FUNCTIONAL MOBILITY, BALANCE, GAIT, AND ENDURANCE: ICD-10-CM

## 2024-08-29 DIAGNOSIS — G89.29 CHRONIC BILATERAL LOW BACK PAIN WITHOUT SCIATICA: ICD-10-CM

## 2024-08-29 PROCEDURE — 97012 MECHANICAL TRACTION THERAPY: CPT | Performed by: PHYSICAL THERAPIST

## 2024-08-29 PROCEDURE — 97110 THERAPEUTIC EXERCISES: CPT | Performed by: PHYSICAL THERAPIST

## 2024-08-29 NOTE — PROGRESS NOTES
[facility] PT INT [facility] ORTHOPAEDICS  [address] [facility] [address]  Dept: [phone]      Physical Therapy Daily Note     Referring MD: Mikey Vargas MD  Diagnosis:     ICD-10-CM    1. Abnormality of gait  R26.9       2. Muscle weakness  M62.81       3. Greater trochanteric bursitis of left hip [M70.62]  M70.62       4. Left hip pain  M25.552       5. Impaired functional mobility, balance, gait, and endurance  Z74.09       6. Chronic bilateral low back pain without sciatica  M54.50     G89.29             Therapy precautions:None  Co-morbidities affecting plan of care: s/p L MARTINE 4/5/22, hypertension, OA   Chief complaints/history of injury:  Pt underwent L MARTINE 4/5/2022 and as pt recovered she noted new onset L sided low back pain. Pt had home health PT after surgery but did not address back pain. Pt completed PT at this location 6/7/23-8/25/23. Symptoms improved but did not completely resolve. She returned for PT 12/7/23-3/8/24 and also had 2 lumbar injections. Pt was discharged with a home program.  Shots did not seem to help. Pt has continued her home program daily. Pt notes that she starts to feel better when she comes to therapy and then symptoms return. Pt returns to therapy with chief complaint of pain across low back. L hip is improving but .   Describe current symptoms: Pain across low back that radiates into L buttock, tenderness lateral L hip. Numbness entire L leg only with extended sitting (30-60 min). Pt has good and bad days.   Patient Stated Goals: get better    Payor: Pay[place] [employer] [place] /  /  /  Billing pattern: Government- total time    Total Timed Procedure Codes: 38 min, Total Time: 60 min Modifier needed: No  Episode visit count:  9/20 approved through 9/27/24     SUBJECTIVE     Pt reports no significant changes.      Medications: no changes since last session    OBJECTIVE     Treatment provided today:  Therapeutic exercise (27894) x 38 min to develop  exercise.  Will continue to promote weight bearing exercise going forward.    PLAN   On next visit: assess effectiveness of last treatment  Continue core strengthening exercises and activities  Continue with lumbar traction    Effective Dates/Duration: 7/15/2024 TO 9/13/2024 (60 days).    Frequency: 2x/week   Interventions may include but are not limited to:   (30977) Therapeutic exercise to develop ROM, strength, endurance and flexibility  (09907) Therapeutic activities using dynamic activities to improve function  (76578) Manual therapy techniques to improve joint and/or soft tissue mobility, ROM, and function as well as helping to decrease pain/spasms and swelling  (28715) Neuromuscular reeducation addressing impaired balance, coordination, kinesthetic sense, posture and proprioception  (04960) Mechanical traction to address spinal/nerve/disc compression, improve joint mobility, and/or soft tissue flexibility  Home exercise program (HEP) development      GOALS     Short term goals to be met by 8/12/2024  (4 weeks):  Patient will demonstrate good recall of HEP requiring no more than minimal verbal cuing for proper form and technique. Goal Met 8/23/2024   Pt will increase lower abdominal strength to at least 3/5 for improved spinal stabilization with functional activity. Goal Met 8/23/2024   Pt will increase strength to at least 3/5 in order to improve gait stability. Goal Not Met 8/23/2024   Pt will improve score on the Oswestry Low Back Pain Questionnaire to </= 42 % disability, demonstrating improved overall function. Goal Not Met 8/23/2024 - Continue     Long term goals to be met by 9/13/2024 (60 days):  Patient will be compliant and independent with a comprehensive HEP and activity progression.  Pt will increase lower abdominal strength to at least  3+/5 for improved spinal stabilization with functional activity.  Pt will increase strength in B hips to at least 3+/5 with MMT for improved stability in gait.  Pt

## 2024-09-04 ENCOUNTER — TREATMENT (OUTPATIENT)
Age: 59
End: 2024-09-04
Payer: MEDICAID

## 2024-09-04 DIAGNOSIS — M62.81 MUSCLE WEAKNESS: ICD-10-CM

## 2024-09-04 DIAGNOSIS — M54.50 CHRONIC BILATERAL LOW BACK PAIN WITHOUT SCIATICA: ICD-10-CM

## 2024-09-04 DIAGNOSIS — M70.62 GREATER TROCHANTERIC BURSITIS OF LEFT HIP: ICD-10-CM

## 2024-09-04 DIAGNOSIS — G89.29 CHRONIC BILATERAL LOW BACK PAIN WITHOUT SCIATICA: ICD-10-CM

## 2024-09-04 DIAGNOSIS — R26.9 ABNORMALITY OF GAIT: Primary | ICD-10-CM

## 2024-09-04 DIAGNOSIS — Z74.09 IMPAIRED FUNCTIONAL MOBILITY, BALANCE, GAIT, AND ENDURANCE: ICD-10-CM

## 2024-09-04 DIAGNOSIS — M25.552 LEFT HIP PAIN: ICD-10-CM

## 2024-09-04 PROCEDURE — 97012 MECHANICAL TRACTION THERAPY: CPT | Performed by: PHYSICAL THERAPIST

## 2024-09-04 PROCEDURE — 97110 THERAPEUTIC EXERCISES: CPT | Performed by: PHYSICAL THERAPIST

## 2024-09-04 NOTE — PROGRESS NOTES
[facility] PT INT [facility] ORTHOPAEDICS  [address] [facility] [address]  Dept: [phone]      Physical Therapy Daily Note     Referring MD: Mikey Vargas MD  Diagnosis:     ICD-10-CM    1. Abnormality of gait  R26.9       2. Muscle weakness  M62.81       3. Greater trochanteric bursitis of left hip [M70.62]  M70.62       4. Left hip pain  M25.552       5. Impaired functional mobility, balance, gait, and endurance  Z74.09       6. Chronic bilateral low back pain without sciatica  M54.50     G89.29             Therapy precautions:None  Co-morbidities affecting plan of care: s/p L MARTINE 4/5/22, hypertension, OA   Chief complaints/history of injury:  Pt underwent L MARTINE 4/5/2022 and as pt recovered she noted new onset L sided low back pain. Pt had home health PT after surgery but did not address back pain. Pt completed PT at this location 6/7/23-8/25/23. Symptoms improved but did not completely resolve. She returned for PT 12/7/23-3/8/24 and also had 2 lumbar injections. Pt was discharged with a home program.  Shots did not seem to help. Pt has continued her home program daily. Pt notes that she starts to feel better when she comes to therapy and then symptoms return. Pt returns to therapy with chief complaint of pain across low back. L hip is improving but .   Describe current symptoms: Pain across low back that radiates into L buttock, tenderness lateral L hip. Numbness entire L leg only with extended sitting (30-60 min). Pt has good and bad days.   Patient Stated Goals: get better    Payor: Payor: [employer] [place] /  /  /  Billing pattern: Government- total time    Total Timed Procedure Codes: 40 min, Total Time: 60 min Modifier needed: No  Episode visit count:  10/20 approved through 9/27/24     SUBJECTIVE     Pt reports no significant changes.      Medications: no changes since last session    OBJECTIVE     Treatment provided today:  Therapeutic exercise (86338) x 40 min to develop

## 2024-09-06 ENCOUNTER — TREATMENT (OUTPATIENT)
Age: 59
End: 2024-09-06

## 2024-09-06 DIAGNOSIS — R26.9 ABNORMALITY OF GAIT: Primary | ICD-10-CM

## 2024-09-06 DIAGNOSIS — M54.16 LUMBAR RADICULOPATHY: ICD-10-CM

## 2024-09-06 DIAGNOSIS — M70.62 GREATER TROCHANTERIC BURSITIS OF LEFT HIP: ICD-10-CM

## 2024-09-06 DIAGNOSIS — Z74.09 IMPAIRED FUNCTIONAL MOBILITY, BALANCE, GAIT, AND ENDURANCE: ICD-10-CM

## 2024-09-06 DIAGNOSIS — M62.81 MUSCLE WEAKNESS: ICD-10-CM

## 2024-09-06 DIAGNOSIS — M25.552 LEFT HIP PAIN: ICD-10-CM

## 2024-09-11 ENCOUNTER — TREATMENT (OUTPATIENT)
Age: 59
End: 2024-09-11
Payer: MEDICAID

## 2024-09-11 DIAGNOSIS — M54.50 CHRONIC BILATERAL LOW BACK PAIN WITHOUT SCIATICA: ICD-10-CM

## 2024-09-11 DIAGNOSIS — Z74.09 IMPAIRED FUNCTIONAL MOBILITY, BALANCE, GAIT, AND ENDURANCE: ICD-10-CM

## 2024-09-11 DIAGNOSIS — M54.16 LUMBAR RADICULOPATHY: ICD-10-CM

## 2024-09-11 DIAGNOSIS — M70.62 GREATER TROCHANTERIC BURSITIS OF LEFT HIP: ICD-10-CM

## 2024-09-11 DIAGNOSIS — M62.81 MUSCLE WEAKNESS: ICD-10-CM

## 2024-09-11 DIAGNOSIS — R26.9 ABNORMALITY OF GAIT: Primary | ICD-10-CM

## 2024-09-11 DIAGNOSIS — M25.552 LEFT HIP PAIN: ICD-10-CM

## 2024-09-11 DIAGNOSIS — G89.29 CHRONIC BILATERAL LOW BACK PAIN WITHOUT SCIATICA: ICD-10-CM

## 2024-09-11 PROCEDURE — 97110 THERAPEUTIC EXERCISES: CPT | Performed by: PHYSICAL THERAPIST

## 2024-09-11 PROCEDURE — 97012 MECHANICAL TRACTION THERAPY: CPT | Performed by: PHYSICAL THERAPIST

## 2024-09-13 ENCOUNTER — TREATMENT (OUTPATIENT)
Age: 59
End: 2024-09-13
Payer: MEDICAID

## 2024-09-13 DIAGNOSIS — M25.552 LEFT HIP PAIN: ICD-10-CM

## 2024-09-13 DIAGNOSIS — R26.9 ABNORMALITY OF GAIT: Primary | ICD-10-CM

## 2024-09-13 DIAGNOSIS — Z74.09 IMPAIRED FUNCTIONAL MOBILITY, BALANCE, GAIT, AND ENDURANCE: ICD-10-CM

## 2024-09-13 DIAGNOSIS — M62.81 MUSCLE WEAKNESS: ICD-10-CM

## 2024-09-13 DIAGNOSIS — M70.62 GREATER TROCHANTERIC BURSITIS OF LEFT HIP: ICD-10-CM

## 2024-09-13 DIAGNOSIS — M54.16 LUMBAR RADICULOPATHY: ICD-10-CM

## 2024-09-13 PROCEDURE — 97012 MECHANICAL TRACTION THERAPY: CPT | Performed by: PHYSICAL THERAPIST

## 2024-09-13 PROCEDURE — 97110 THERAPEUTIC EXERCISES: CPT | Performed by: PHYSICAL THERAPIST

## 2024-10-02 ENCOUNTER — TELEPHONE (OUTPATIENT)
Dept: INTERNAL MEDICINE CLINIC | Facility: CLINIC | Age: 59
End: 2024-10-02

## 2024-10-02 DIAGNOSIS — I10 PRIMARY HYPERTENSION: ICD-10-CM

## 2024-10-02 DIAGNOSIS — E78.2 MIXED HYPERLIPIDEMIA: ICD-10-CM

## 2024-10-02 RX ORDER — ATORVASTATIN CALCIUM 10 MG/1
10 TABLET, FILM COATED ORAL DAILY
Qty: 30 TABLET | Refills: 0 | Status: SHIPPED | OUTPATIENT
Start: 2024-10-02

## 2024-10-02 RX ORDER — LISINOPRIL 20 MG/1
20 TABLET ORAL DAILY
Qty: 30 TABLET | Refills: 0 | Status: SHIPPED | OUTPATIENT
Start: 2024-10-02

## 2024-10-02 RX ORDER — HYDROCHLOROTHIAZIDE 25 MG/1
25 TABLET ORAL DAILY
Qty: 30 TABLET | Refills: 0 | Status: SHIPPED | OUTPATIENT
Start: 2024-10-02

## 2024-10-17 ENCOUNTER — TELEPHONE (OUTPATIENT)
Dept: INTERNAL MEDICINE CLINIC | Facility: CLINIC | Age: 59
End: 2024-10-17

## 2024-10-17 DIAGNOSIS — E78.2 MIXED HYPERLIPIDEMIA: ICD-10-CM

## 2024-10-17 DIAGNOSIS — I10 PRIMARY HYPERTENSION: ICD-10-CM

## 2024-10-17 RX ORDER — ATORVASTATIN CALCIUM 10 MG/1
10 TABLET, FILM COATED ORAL DAILY
Qty: 30 TABLET | Refills: 0 | Status: SHIPPED | OUTPATIENT
Start: 2024-10-17

## 2024-10-17 RX ORDER — HYDROCHLOROTHIAZIDE 25 MG/1
25 TABLET ORAL DAILY
Qty: 30 TABLET | Refills: 0 | Status: SHIPPED | OUTPATIENT
Start: 2024-10-17

## 2024-10-17 RX ORDER — LISINOPRIL 20 MG/1
20 TABLET ORAL DAILY
Qty: 30 TABLET | Refills: 0 | Status: SHIPPED | OUTPATIENT
Start: 2024-10-17

## 2024-10-17 NOTE — TELEPHONE ENCOUNTER
Needs refills on [facility] pharmacy said they did not receive these on 10-2-24    hydroCHLOROthiazide (HYDRODIURIL) 25 MG tablet   And   lisinopril (PRINIVIL;ZESTRIL) 20 MG tablet   And  atorvastatin (LIPITOR) 10 MG tablet     Send to  [facility]/pharmacy #[id number] - [facility] [address] - P [phone] -  [phone]

## 2024-10-22 ENCOUNTER — LAB (OUTPATIENT)
Dept: INTERNAL MEDICINE CLINIC | Facility: CLINIC | Age: 59
End: 2024-10-22

## 2024-10-22 DIAGNOSIS — R73.01 IFG (IMPAIRED FASTING GLUCOSE): ICD-10-CM

## 2024-10-22 DIAGNOSIS — E78.2 MIXED HYPERLIPIDEMIA: ICD-10-CM

## 2024-10-22 DIAGNOSIS — I10 PRIMARY HYPERTENSION: ICD-10-CM

## 2024-10-22 DIAGNOSIS — M79.10 MYALGIA: ICD-10-CM

## 2024-10-22 LAB
ALBUMIN SERPL-MCNC: 3.5 G/DL (ref 3.5–5)
ALBUMIN/GLOB SERPL: 1 (ref 1–1.9)
ALP SERPL-CCNC: 148 U/L (ref 35–104)
ALT SERPL-CCNC: 8 U/L (ref 8–45)
ANION GAP SERPL CALC-SCNC: 11 MMOL/L (ref 9–18)
AST SERPL-CCNC: 15 U/L (ref 15–37)
BILIRUB SERPL-MCNC: 0.3 MG/DL (ref 0–1.2)
BUN SERPL-MCNC: 19 MG/DL (ref 6–23)
CALCIUM SERPL-MCNC: 9.6 MG/DL (ref 8.8–10.2)
CHLORIDE SERPL-SCNC: 105 MMOL/L (ref 98–107)
CHOLEST SERPL-MCNC: 174 MG/DL (ref 0–200)
CO2 SERPL-SCNC: 25 MMOL/L (ref 20–28)
CREAT SERPL-MCNC: 1.45 MG/DL (ref 0.6–1.1)
CREAT UR-MCNC: 232 MG/DL (ref 28–217)
ERYTHROCYTE [DISTWIDTH] IN BLOOD BY AUTOMATED COUNT: 13.4 % (ref 11.9–14.6)
EST. AVERAGE GLUCOSE BLD GHB EST-MCNC: 105 MG/DL
GLOBULIN SER CALC-MCNC: 3.4 G/DL (ref 2.3–3.5)
GLUCOSE SERPL-MCNC: 126 MG/DL (ref 70–99)
HBA1C MFR BLD: 5.3 % (ref 0–5.6)
HCT VFR BLD AUTO: 39.1 % (ref 35.8–46.3)
HDLC SERPL-MCNC: 51 MG/DL (ref 40–60)
HDLC SERPL: 3.4 (ref 0–5)
HGB BLD-MCNC: 12.4 G/DL (ref 11.7–15.4)
LDLC SERPL CALC-MCNC: 104 MG/DL (ref 0–100)
MAGNESIUM SERPL-MCNC: 2 MG/DL (ref 1.8–2.4)
MCH RBC QN AUTO: 30.2 PG (ref 26.1–32.9)
MCHC RBC AUTO-ENTMCNC: 31.7 G/DL (ref 31.4–35)
MCV RBC AUTO: 95.4 FL (ref 82–102)
MICROALBUMIN UR-MCNC: <1.2 MG/DL (ref 0–20)
MICROALBUMIN/CREAT UR-RTO: ABNORMAL MG/G (ref 0–30)
NRBC # BLD: 0 K/UL (ref 0–0.2)
PLATELET # BLD AUTO: 334 K/UL (ref 150–450)
PMV BLD AUTO: 10.3 FL (ref 9.4–12.3)
POTASSIUM SERPL-SCNC: 4.3 MMOL/L (ref 3.5–5.1)
PROT SERPL-MCNC: 6.8 G/DL (ref 6.3–8.2)
RBC # BLD AUTO: 4.1 M/UL (ref 4.05–5.2)
SODIUM SERPL-SCNC: 140 MMOL/L (ref 136–145)
TRIGL SERPL-MCNC: 93 MG/DL (ref 0–150)
TSH, 3RD GENERATION: 2.82 UIU/ML (ref 0.27–4.2)
VLDLC SERPL CALC-MCNC: 19 MG/DL (ref 6–23)
WBC # BLD AUTO: 8.6 K/UL (ref 4.3–11.1)

## 2024-10-29 ENCOUNTER — OFFICE VISIT (OUTPATIENT)
Dept: INTERNAL MEDICINE CLINIC | Facility: CLINIC | Age: 59
End: 2024-10-29

## 2024-10-29 VITALS
WEIGHT: 231 LBS | SYSTOLIC BLOOD PRESSURE: 120 MMHG | HEART RATE: 94 BPM | DIASTOLIC BLOOD PRESSURE: 60 MMHG | HEIGHT: 63 IN | BODY MASS INDEX: 40.93 KG/M2 | OXYGEN SATURATION: 97 % | TEMPERATURE: 97.7 F

## 2024-10-29 DIAGNOSIS — E78.2 MIXED HYPERLIPIDEMIA: ICD-10-CM

## 2024-10-29 DIAGNOSIS — Z23 NEEDS FLU SHOT: ICD-10-CM

## 2024-10-29 DIAGNOSIS — Z12.31 ENCOUNTER FOR SCREENING MAMMOGRAM FOR MALIGNANT NEOPLASM OF BREAST: ICD-10-CM

## 2024-10-29 DIAGNOSIS — I10 PRIMARY HYPERTENSION: ICD-10-CM

## 2024-10-29 DIAGNOSIS — M54.50 CHRONIC LEFT-SIDED LOW BACK PAIN WITHOUT SCIATICA: ICD-10-CM

## 2024-10-29 DIAGNOSIS — R73.01 IFG (IMPAIRED FASTING GLUCOSE): ICD-10-CM

## 2024-10-29 DIAGNOSIS — N18.30 CRI (CHRONIC RENAL INSUFFICIENCY), STAGE 3 (MODERATE) (HCC): Primary | ICD-10-CM

## 2024-10-29 DIAGNOSIS — Z00.00 ROUTINE ADULT HEALTH MAINTENANCE: ICD-10-CM

## 2024-10-29 DIAGNOSIS — G89.29 CHRONIC LEFT-SIDED LOW BACK PAIN WITHOUT SCIATICA: ICD-10-CM

## 2024-10-29 DIAGNOSIS — Z12.11 SCREENING FOR COLON CANCER: ICD-10-CM

## 2024-10-29 RX ORDER — HYDROCHLOROTHIAZIDE 25 MG/1
25 TABLET ORAL DAILY
Qty: 90 TABLET | Refills: 11 | Status: SHIPPED | OUTPATIENT
Start: 2024-10-29

## 2024-10-29 RX ORDER — ATORVASTATIN CALCIUM 10 MG/1
10 TABLET, FILM COATED ORAL DAILY
Qty: 90 TABLET | Refills: 11 | Status: SHIPPED | OUTPATIENT
Start: 2024-10-29

## 2024-10-29 RX ORDER — LISINOPRIL 20 MG/1
20 TABLET ORAL DAILY
Qty: 90 TABLET | Refills: 11 | Status: SHIPPED | OUTPATIENT
Start: 2024-10-29

## 2024-10-29 SDOH — ECONOMIC STABILITY: FOOD INSECURITY: WITHIN THE PAST 12 MONTHS, THE FOOD YOU BOUGHT JUST DIDN'T LAST AND YOU DIDN'T HAVE MONEY TO GET MORE.: NEVER TRUE

## 2024-10-29 SDOH — ECONOMIC STABILITY: INCOME INSECURITY: HOW HARD IS IT FOR YOU TO PAY FOR THE VERY BASICS LIKE FOOD, HOUSING, MEDICAL CARE, AND HEATING?: NOT HARD AT ALL

## 2024-10-29 SDOH — ECONOMIC STABILITY: FOOD INSECURITY: WITHIN THE PAST 12 MONTHS, YOU WORRIED THAT YOUR FOOD WOULD RUN OUT BEFORE YOU GOT MONEY TO BUY MORE.: NEVER TRUE

## 2024-10-29 ASSESSMENT — PATIENT HEALTH QUESTIONNAIRE - PHQ9
SUM OF ALL RESPONSES TO PHQ QUESTIONS 1-9: 0
1. LITTLE INTEREST OR PLEASURE IN DOING THINGS: NOT AT ALL
2. FEELING DOWN, DEPRESSED OR HOPELESS: NOT AT ALL
SUM OF ALL RESPONSES TO PHQ QUESTIONS 1-9: 0
SUM OF ALL RESPONSES TO PHQ9 QUESTIONS 1 & 2: 0

## 2024-10-29 NOTE — PROGRESS NOTES
Alma was seen today for follow-up.    Diagnoses and all orders for this visit:    CRI (chronic renal insufficiency), stage 3 (moderate) (HCC)  -     hydroCHLOROthiazide (HYDRODIURIL) 25 MG tablet; Take 1 tablet by mouth daily  -     lisinopril (PRINIVIL;ZESTRIL) 20 MG tablet; Take 1 tablet by mouth daily  -     Lipid Panel; Future  -     Hemoglobin A1C; Future  -     TSH; Future  -     Comprehensive Metabolic Panel; Future  -     CBC; Future  -     Hemoglobin A1C; Future  -     Lipid Panel; Future  -     Microalbumin / Creatinine Urine Ratio; Future  -     CBC; Future  -     Comprehensive Metabolic Panel; Future    Mixed hyperlipidemia  -     atorvastatin (LIPITOR) 10 MG tablet; Take 1 tablet by mouth daily  -     Lipid Panel; Future  -     Hemoglobin A1C; Future  -     TSH; Future  -     Comprehensive Metabolic Panel; Future  -     CBC; Future  -     Hemoglobin A1C; Future  -     Lipid Panel; Future  -     Microalbumin / Creatinine Urine Ratio; Future  -     CBC; Future  -     Comprehensive Metabolic Panel; Future    Primary hypertension  -     hydroCHLOROthiazide (HYDRODIURIL) 25 MG tablet; Take 1 tablet by mouth daily  -     lisinopril (PRINIVIL;ZESTRIL) 20 MG tablet; Take 1 tablet by mouth daily  -     Lipid Panel; Future  -     Hemoglobin A1C; Future  -     TSH; Future  -     Comprehensive Metabolic Panel; Future  -     CBC; Future  -     Hemoglobin A1C; Future  -     Lipid Panel; Future  -     Microalbumin / Creatinine Urine Ratio; Future  -     CBC; Future  -     Comprehensive Metabolic Panel; Future    Chronic left-sided low back pain without sciatica  Comments:  no alarm symptoms ,recent mri ok. refer PT  Orders:  -     [facility] - Physical Therapy, [facility]    Needs flu shot    Encounter for screening mammogram for malignant neoplasm of breast  -     [facility] DIGITAL SCREEN W OR WO CAD BILATERAL; Future    Routine adult health maintenance  -     [facility]    Screening

## 2024-10-31 ASSESSMENT — ENCOUNTER SYMPTOMS
WHEEZING: 0
NAUSEA: 0
ABDOMINAL PAIN: 0
DIARRHEA: 0
VOMITING: 0
CONSTIPATION: 0
SHORTNESS OF BREATH: 0
SINUS PAIN: 0

## 2024-12-05 ENCOUNTER — HOSPITAL ENCOUNTER (OUTPATIENT)
Dept: PHYSICAL THERAPY | Age: 59
Setting detail: RECURRING SERIES
Discharge: HOME | End: 2024-12-08
Attending: INTERNAL MEDICINE
Payer: MEDICAID

## 2024-12-05 DIAGNOSIS — G89.29 CHRONIC LEFT-SIDED LOW BACK PAIN WITHOUT SCIATICA: Primary | ICD-10-CM

## 2024-12-05 DIAGNOSIS — M62.81 MUSCLE WEAKNESS (GENERALIZED): ICD-10-CM

## 2024-12-05 DIAGNOSIS — M54.50 CHRONIC LEFT-SIDED LOW BACK PAIN WITHOUT SCIATICA: Primary | ICD-10-CM

## 2024-12-05 DIAGNOSIS — M54.16 LUMBAR RADICULOPATHY: ICD-10-CM

## 2024-12-05 PROCEDURE — 97161 PT EVAL LOW COMPLEX 20 MIN: CPT

## 2024-12-05 ASSESSMENT — PAIN SCALES - GENERAL: PAINLEVEL_OUTOF10: 7

## 2024-12-05 NOTE — PROGRESS NOTES
evaluation, education on findings, discussion of POC.     THERAPEUTIC EXERCISE: (25 minutes - unbilled pt is eval only): Exercises per grid below to improve mobility, strength, and balance. Required visual, verbal and tactile cues to promote proper body alignment, posture and body mechanics. Progressed resistance, range and repetitions as indicated.     Most Recent Tx:  Treatment Area:   L LBP with LLE weakness Date:   12/5/24   Exercise    Bridges DL 2x10   ASLR Hooklying: 2x10 R,   Unable on L 2/2 mms weakness   Marching Hooklying: 2x10 B for greater challenge to L hip flexors   Clamshells Sidelying: attempted on left but unable 2/2 mms weakness    Hooklying: 2x10 B, alt   Sciatic Nerve Glide Sitting: 2x10 L - knee flex/ext. Maintained DF         HEP   [employer] Access Code: [email or web address]   Exercises: DL bridge, marching in hooklying, hooklying clamshells alt      Treatment/Session Summary:    Treatment Assessment: See Evaluation Note from today. Patient demonstrating greater weakness on left versus right. Provided with HEP as noted above to begin to address LLE mms weakness.  Communication/Consultation: Therapy Evaluation sent to referring provider  Equipment provided: HEP  Recommendations/Intent for next treatment session: Next visit will focus on advancements to more challenging activities. Progress repetitions, weight, and complexity of functional movement per pt tolerance and as indicated.     >Total Treatment Billable Duration: 0 minutes - eval only per insurance auth  Time In: 0801  Time Out: 0841     Lynne Johnston, PT, DPT    Charge Capture  [email or web address] [facility]  Appt Desk  Attendance Report      Future Appointments   Date Time Provider Department Center   4/29/2025  9:30 AM TR[facility] LAB RESOURCE [facility] DEP   5/6/2025 10:00 AM Rafael Clifton,  TRIM [facility] DEP

## 2024-12-05 NOTE — THERAPY EVALUATION
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 [place] [address] [place]  [facility] [address]  Phone: [phone]  Fax: [phone] Plan Frequency: 2x/week until end POC    Plan of Care/Certification Expiration Date: 25              Plan of Care/Certification Expiration Date:  Plan of Care/Certification Expiration Date: 25    Frequency/Duration:   Plan Frequency: 2x/week until end POC      Time In/Out:   Time In: 08  Time Out: 08      PT Visit Info:    Plan Frequency: 2x/week until end POC  Total # of Visits Approved: 0 (eval only)  Total # of Visits to Date: 1  Progress Note Counter: 1      Visit Count:  1                OUTPATIENT PHYSICAL THERAPY:             Initial Assessment 2024                 Episode (L LBP with LE weakness)         Treatment Diagnosis:     Chronic left-sided low back pain without sciatica  Lumbar radiculopathy  Muscle weakness (generalized)  Medical/Referring Diagnosis:    Chronic left-sided low back pain without sciatica    Referring Physician:  Rafael Clifton DO MD Orders:  PT Eval and Treat   Return MD Appt:   with PCP  Date of Onset:  Onset Date:  (about a year ago)    Allergies:  Patient has no known allergies.    Restrictions/Precautions:   HTN        Medications Last Reviewed:  2024       SUBJECTIVE     History of Injury/Illness (Reason for Referral):  24: Alma Weir relates she began to have left sided low back pain about a year after she had a left MARTINE. She is having numbness in the left leg (to her foot). Denies UI and FI. She does have a hx of this when she first started her diuretics but it has since cleared up. Does have inc urinary frequency but is on a diuretic. Injections in the past but no improvement with pain per pt - not sure when the last one was, sometime this year.    Per chart review, lumbar MRI on 1/3/24:  \"FINDINGS:

## 2025-04-29 ENCOUNTER — LAB (OUTPATIENT)
Dept: INTERNAL MEDICINE CLINIC | Facility: CLINIC | Age: 60
End: 2025-04-29

## 2025-04-29 DIAGNOSIS — E78.2 MIXED HYPERLIPIDEMIA: ICD-10-CM

## 2025-04-29 DIAGNOSIS — N18.30 CRI (CHRONIC RENAL INSUFFICIENCY), STAGE 3 (MODERATE) (HCC): ICD-10-CM

## 2025-04-29 DIAGNOSIS — I10 PRIMARY HYPERTENSION: ICD-10-CM

## 2025-04-29 LAB
ALBUMIN SERPL-MCNC: 3.2 G/DL (ref 3.5–5)
ALBUMIN/GLOB SERPL: 1.1 (ref 1–1.9)
ALP SERPL-CCNC: 117 U/L (ref 35–104)
ALT SERPL-CCNC: 17 U/L (ref 8–45)
ANION GAP SERPL CALC-SCNC: 11 MMOL/L (ref 7–16)
AST SERPL-CCNC: 29 U/L (ref 15–37)
BILIRUB SERPL-MCNC: 0.4 MG/DL (ref 0–1.2)
BUN SERPL-MCNC: 19 MG/DL (ref 6–23)
CALCIUM SERPL-MCNC: 9 MG/DL (ref 8.8–10.2)
CHLORIDE SERPL-SCNC: 105 MMOL/L (ref 98–107)
CHOLEST SERPL-MCNC: 167 MG/DL (ref 0–200)
CO2 SERPL-SCNC: 23 MMOL/L (ref 20–29)
CREAT SERPL-MCNC: 1.54 MG/DL (ref 0.6–1.1)
ERYTHROCYTE [DISTWIDTH] IN BLOOD BY AUTOMATED COUNT: 13.8 % (ref 11.9–14.6)
EST. AVERAGE GLUCOSE BLD GHB EST-MCNC: 99 MG/DL
GLOBULIN SER CALC-MCNC: 2.8 G/DL (ref 2.3–3.5)
GLUCOSE SERPL-MCNC: 101 MG/DL (ref 70–99)
HBA1C MFR BLD: 5.1 % (ref 0–5.6)
HCT VFR BLD AUTO: 40 % (ref 35.8–46.3)
HDLC SERPL-MCNC: 54 MG/DL (ref 40–60)
HDLC SERPL: 3.1 (ref 0–5)
HGB BLD-MCNC: 12.6 G/DL (ref 11.7–15.4)
LDLC SERPL CALC-MCNC: 94 MG/DL (ref 0–100)
MCH RBC QN AUTO: 29.6 PG (ref 26.1–32.9)
MCHC RBC AUTO-ENTMCNC: 31.5 G/DL (ref 31.4–35)
MCV RBC AUTO: 94.1 FL (ref 82–102)
NRBC # BLD: 0 K/UL (ref 0–0.2)
PLATELET # BLD AUTO: 348 K/UL (ref 150–450)
PMV BLD AUTO: 10.3 FL (ref 9.4–12.3)
POTASSIUM SERPL-SCNC: 4.6 MMOL/L (ref 3.5–5.1)
PROT SERPL-MCNC: 6 G/DL (ref 6.3–8.2)
RBC # BLD AUTO: 4.25 M/UL (ref 4.05–5.2)
SODIUM SERPL-SCNC: 138 MMOL/L (ref 136–145)
TRIGL SERPL-MCNC: 91 MG/DL (ref 0–150)
TSH, 3RD GENERATION: 2.54 UIU/ML (ref 0.27–4.2)
VLDLC SERPL CALC-MCNC: 18 MG/DL (ref 6–23)
WBC # BLD AUTO: 6.2 K/UL (ref 4.3–11.1)

## 2025-05-08 ENCOUNTER — OFFICE VISIT (OUTPATIENT)
Dept: INTERNAL MEDICINE CLINIC | Facility: CLINIC | Age: 60
End: 2025-05-08
Payer: MEDICAID

## 2025-05-08 VITALS
HEIGHT: 63 IN | WEIGHT: 233 LBS | BODY MASS INDEX: 41.29 KG/M2 | HEART RATE: 88 BPM | SYSTOLIC BLOOD PRESSURE: 112 MMHG | DIASTOLIC BLOOD PRESSURE: 70 MMHG | OXYGEN SATURATION: 96 % | TEMPERATURE: 97.6 F

## 2025-05-08 DIAGNOSIS — M54.50 CHRONIC LEFT-SIDED LOW BACK PAIN WITHOUT SCIATICA: ICD-10-CM

## 2025-05-08 DIAGNOSIS — G89.29 CHRONIC LEFT-SIDED LOW BACK PAIN WITHOUT SCIATICA: ICD-10-CM

## 2025-05-08 DIAGNOSIS — R73.01 IFG (IMPAIRED FASTING GLUCOSE): ICD-10-CM

## 2025-05-08 DIAGNOSIS — N18.30 CRI (CHRONIC RENAL INSUFFICIENCY), STAGE 3 (MODERATE) (HCC): ICD-10-CM

## 2025-05-08 DIAGNOSIS — M16.12 PRIMARY OSTEOARTHRITIS OF LEFT HIP: ICD-10-CM

## 2025-05-08 DIAGNOSIS — I10 PRIMARY HYPERTENSION: Primary | ICD-10-CM

## 2025-05-08 DIAGNOSIS — F43.21 ADJUSTMENT DISORDER WITH DEPRESSED MOOD: ICD-10-CM

## 2025-05-08 PROCEDURE — 3078F DIAST BP <80 MM HG: CPT | Performed by: INTERNAL MEDICINE

## 2025-05-08 PROCEDURE — 99214 OFFICE O/P EST MOD 30 MIN: CPT | Performed by: INTERNAL MEDICINE

## 2025-05-08 PROCEDURE — 3074F SYST BP LT 130 MM HG: CPT | Performed by: INTERNAL MEDICINE

## 2025-05-08 SDOH — ECONOMIC STABILITY: FOOD INSECURITY: WITHIN THE PAST 12 MONTHS, THE FOOD YOU BOUGHT JUST DIDN'T LAST AND YOU DIDN'T HAVE MONEY TO GET MORE.: NEVER TRUE

## 2025-05-08 SDOH — ECONOMIC STABILITY: FOOD INSECURITY: WITHIN THE PAST 12 MONTHS, YOU WORRIED THAT YOUR FOOD WOULD RUN OUT BEFORE YOU GOT MONEY TO BUY MORE.: NEVER TRUE

## 2025-05-08 ASSESSMENT — PATIENT HEALTH QUESTIONNAIRE - PHQ9
SUM OF ALL RESPONSES TO PHQ QUESTIONS 1-9: 10
4. FEELING TIRED OR HAVING LITTLE ENERGY: SEVERAL DAYS
10. IF YOU CHECKED OFF ANY PROBLEMS, HOW DIFFICULT HAVE THESE PROBLEMS MADE IT FOR YOU TO DO YOUR WORK, TAKE CARE OF THINGS AT HOME, OR GET ALONG WITH OTHER PEOPLE: SOMEWHAT DIFFICULT
1. LITTLE INTEREST OR PLEASURE IN DOING THINGS: NEARLY EVERY DAY
6. FEELING BAD ABOUT YOURSELF - OR THAT YOU ARE A FAILURE OR HAVE LET YOURSELF OR YOUR FAMILY DOWN: NOT AT ALL
5. POOR APPETITE OR OVEREATING: NOT AT ALL
SUM OF ALL RESPONSES TO PHQ QUESTIONS 1-9: 10
7. TROUBLE CONCENTRATING ON THINGS, SUCH AS READING THE NEWSPAPER OR WATCHING TELEVISION: NOT AT ALL
2. FEELING DOWN, DEPRESSED OR HOPELESS: NEARLY EVERY DAY
8. MOVING OR SPEAKING SO SLOWLY THAT OTHER PEOPLE COULD HAVE NOTICED. OR THE OPPOSITE, BEING SO FIGETY OR RESTLESS THAT YOU HAVE BEEN MOVING AROUND A LOT MORE THAN USUAL: NOT AT ALL
9. THOUGHTS THAT YOU WOULD BE BETTER OFF DEAD, OR OF HURTING YOURSELF: NOT AT ALL
SUM OF ALL RESPONSES TO PHQ QUESTIONS 1-9: 10
3. TROUBLE FALLING OR STAYING ASLEEP: NEARLY EVERY DAY
SUM OF ALL RESPONSES TO PHQ QUESTIONS 1-9: 10

## 2025-05-08 NOTE — PROGRESS NOTES
Alma Weir (: 1965) is a 60 y.o. female, here for evaluation of the following chief complaint(s):  Follow-up (6 month check up and review labs lipids,htn)     Assessment & Plan  1. Back pain.  - Reports lower back pain that worsens with standing and limits her ability to walk.  - Physical exam indicates pain across the lower back, bilaterally.  - Discussed the potential need for an MRI if symptoms persist.  - Referral for physical therapy at [facility] has been made; advised to avoid NSAIDs such as Motrin, Aleve, Goody's, and ibuprofen due to potential kidney impact. Aspirin is permitted.    2. Depression.  - Reports feeling depressed, likely related to the recent death of her father.  - No suicidal ideations or thoughts of self-harm.  - Encouraged to engage in activities she enjoys, maintain social connections, and attend [facility] services.  - If her condition deteriorates, she is advised to inform the clinic promptly.    3. Risk for diabetes.  - Glucose level slightly elevated at 101, indicating a risk for diabetes.  - Physical exam findings indicate no current diabetes; thyroid function is normal.  - Advised to monitor sugar intake and consider lifestyle modifications to reduce risk.  - Cholesterol levels have improved with current medication; advised to continue current cholesterol medication.    4. Health maintenance.  - Kidney function will be monitored with a urine test during the next visit to check for proteinuria.  - Blood pressure is well-controlled with current medication; advised to continue current blood pressure medication.  - Hemoglobin levels are good, no anemia; white blood cells are within normal range.  - Liver function is normal; advised to limit alcohol intake.    Follow-up  The patient will follow up in 6 months.  1. Primary hypertension  -     Albumin/Creatinine Ratio, Urine; Future  -     Lipid Panel; Future  -     Hemoglobin A1C; Future  -     Comprehensive Metabolic Panel;

## 2025-05-29 ENCOUNTER — HOSPITAL ENCOUNTER (OUTPATIENT)
Dept: PHYSICAL THERAPY | Age: 60
Setting detail: RECURRING SERIES
Discharge: HOME | End: 2025-05-31
Attending: INTERNAL MEDICINE
Payer: MEDICAID

## 2025-05-29 DIAGNOSIS — M54.59 OTHER LOW BACK PAIN: ICD-10-CM

## 2025-05-29 DIAGNOSIS — R26.2 DIFFICULTY IN WALKING: Primary | ICD-10-CM

## 2025-05-29 PROCEDURE — 97162 PT EVAL MOD COMPLEX 30 MIN: CPT

## 2025-05-29 NOTE — PROGRESS NOTES
Alma Weir  : 1965  Primary: [employer] [place] (Medicaid [place])  Secondary:  [facility] @ [place] [address] [place] [facility] [address]  Phone: [phone]  Fax: [phone] Plan Frequency: 2x/wik    Plan of Care/Certification Expiration Date: 25        Plan of Care/Certification Expiration Date:  Plan of Care/Certification Expiration Date: 25    Frequency/Duration: Plan Frequency: 2x/wik      Time In/Out:   Time In: 1153  Time Out: 1227      PT Visit Info:    Plan Frequency: 2x/wik  Progress Note Counter: 1      Visit Count:  1    OUTPATIENT PHYSICAL THERAPY:   Treatment Note 2025       Episode  (LBP)               Treatment Diagnosis:    Difficulty in walking  Other low back pain  Medical/Referring Diagnosis:    Chronic left-sided low back pain without sciatica [M54.50, G89.29]  Primary osteoarthritis of left hip [M16.12]      Referring Physician:  Rafael Clifton DO MD Orders:  PT Eval and Treat   Return MD Appt:    Date of Onset:  Onset Date:  (about a year ago)     Allergies:   Patient has no known allergies.  Restrictions/Precautions:   Fall risk      Interventions Planned (Treatment may consist of any combination of the following):     See Assessment Note    REASON FOR TREATMENT:  LBP with a flexion preference. MRI from  showed foraminal narrowing due to disc protrusions at multiple levels with no central canal stenosis. showed foraminal stenosis at multiple levels. She has recurrent LBP about 3 years that worsened one month ago after a fall ascending steps. She has a flexion preference. Symptoms are usually isolated to her back, but she has tingling in her LLE at times with prolonged sitting. Apart from left hip flexion, there was no significant weakness with MMT, but her 30 second chair rise was 2.5 due to reported weakness.     Functional limitations reported at initial Eval: pain/difficulty with prolonged standing, stooping,

## 2025-05-29 NOTE — THERAPY EVALUATION
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 @ [place] [address] [place]  [facility] [address]  Phone: [phone]  Fax: [phone] Plan Frequency: 2x/wik  Plan of Care/Certification Expiration Date: 25        Plan of Care/Certification Expiration Date:  Plan of Care/Certification Expiration Date: 25    Frequency/Duration: Plan Frequency: 2x/wik      Time In/Out:   Time In: 1153  Time Out: 1227      PT Visit Info:    Plan Frequency: 2x/wik  Progress Note Counter: 1      Visit Count:  1                OUTPATIENT PHYSICAL THERAPY:             Initial Assessment 2025               Episode (LBP)         Treatment Diagnosis:     Difficulty in walking  Other low back pain  Medical/Referring Diagnosis:    Chronic left-sided low back pain without sciatica [M54.50, G89.29]  Primary osteoarthritis of left hip [M16.12]    Referring Physician:  Rafael Clifton DO MD Orders:  PT Eval and Treat   Return MD Appt:    Date of Onset:      Allergies:  Patient has no known allergies.  Restrictions/Precautions:    Fall risk      Medications Last Reviewed:  2025     SUBJECTIVE   History of Injury/Illness (Reason for Referral):  Pt is a 61 yo female referred to physical therapy due to left hip OA and LBP. She says her back pain worsened over the past month after a fall trying to ascend a step and missed the step. She reports her back pain started after she had a L MARTINE. She thinks that was in  or . She says her legs feel weak. She has a friend that goes and gets groceries and helps with lifting and household chores. She reports having nerve blocks and not noticing relief from them.     Pain/Symptom Location: pain in her bilateral lumbar spine. She denies any pain in either LE but has occasional numbness down her L LE to her foot with prolonged sitting.           Aggravating Factors/ Functional limitations:

## 2025-06-13 ENCOUNTER — HOSPITAL ENCOUNTER (OUTPATIENT)
Dept: PHYSICAL THERAPY | Age: 60
Setting detail: RECURRING SERIES
Discharge: HOME | End: 2025-06-15
Attending: INTERNAL MEDICINE
Payer: MEDICAID

## 2025-06-13 PROCEDURE — 97530 THERAPEUTIC ACTIVITIES: CPT

## 2025-06-13 PROCEDURE — 97110 THERAPEUTIC EXERCISES: CPT

## 2025-06-13 NOTE — PROGRESS NOTES
Alma Weir  : 1965  Primary: [employer] [place] (Medicaid [place])  Secondary:  [facility] @ [place] [address] [place] [address]  Phone: [phone]  Fax: [phone] Plan Frequency: 2x/wk    Plan of Care/Certification Expiration Date: 25        Plan of Care/Certification Expiration Date:  Plan of Care/Certification Expiration Date: 25    Frequency/Duration: Plan Frequency: 2x/wk      Time In/Out:   Time In: 1150      PT Visit Info:    Plan Frequency: 2x/wk  Progress Note Counter: 2      Visit Count:  2    OUTPATIENT PHYSICAL THERAPY:   Treatment Note 2025       Episode  (LBP)               Treatment Diagnosis:    Difficulty in walking  Other low back pain  Medical/Referring Diagnosis:    Chronic left-sided low back pain without sciatica [M54.50, G89.29]  Primary osteoarthritis of left hip [M16.12]      Referring Physician:  Rafael Clifton DO MD Orders:  PT Eval and Treat   Return MD Appt:    Date of Onset:  Onset Date:  (about a year ago)     Allergies:   Patient has no known allergies.  Restrictions/Precautions:   Fall risk      Interventions Planned (Treatment may consist of any combination of the following):     See Assessment Note    REASON FOR TREATMENT:  LBP with a flexion preference. MRI from  showed foraminal narrowing due to disc protrusions at multiple levels with no central canal stenosis. showed foraminal stenosis at multiple levels. She has recurrent LBP about 3 years that worsened one month ago after a fall ascending steps. She has a flexion preference. Symptoms are usually isolated to her back, but she has tingling in her LLE at times with prolonged standing. Apart from left hip flexion, there was no significant weakness with MMT, but her 30 second chair rise was 2.5 due to reported weakness.     Functional limitations reported at initial Eval: pain/difficulty with prolonged standing, stooping, lifting, getting off

## 2025-06-17 ENCOUNTER — APPOINTMENT (OUTPATIENT)
Dept: PHYSICAL THERAPY | Age: 60
End: 2025-06-17
Attending: INTERNAL MEDICINE
Payer: MEDICAID

## 2025-06-19 ENCOUNTER — HOSPITAL ENCOUNTER (OUTPATIENT)
Dept: PHYSICAL THERAPY | Age: 60
Setting detail: RECURRING SERIES
Discharge: HOME | End: 2025-06-21
Attending: INTERNAL MEDICINE
Payer: MEDICAID

## 2025-06-19 PROCEDURE — 97110 THERAPEUTIC EXERCISES: CPT

## 2025-06-19 NOTE — PROGRESS NOTES
Alma Weir  : 1965  Primary: [employer] [place] (Medicaid [place])  Secondary:  [facility] @ [place] [address] [place] [facility] [address]  Phone: [phone]  Fax: [phone] Plan Frequency: 2x/wk    Plan of Care/Certification Expiration Date: 25        Plan of Care/Certification Expiration Date:  Plan of Care/Certification Expiration Date: 25    Frequency/Duration: Plan Frequency: 2x/wk      Time In/Out:   Time In: 1114  Time Out: 1143      PT Visit Info:    Plan Frequency: 2x/wk  Progress Note Counter: 3      Visit Count:  3    OUTPATIENT PHYSICAL THERAPY:   Treatment Note 2025       Episode  (LBP)               Treatment Diagnosis:    Difficulty in walking  Other low back pain  Medical/Referring Diagnosis:    Chronic left-sided low back pain without sciatica [M54.50, G89.29]  Primary osteoarthritis of left hip [M16.12]      Referring Physician:  Rafael Clifton DO MD Orders:  PT Eval and Treat   Return MD Appt:    Date of Onset:  Onset Date:  (about a year ago)     Allergies:   Patient has no known allergies.  Restrictions/Precautions:   Fall risk      Interventions Planned (Treatment may consist of any combination of the following):     See Assessment Note    REASON FOR TREATMENT:  LBP with a flexion preference. MRI from  showed foraminal narrowing due to disc protrusions at multiple levels with no central canal stenosis. She has recurrent LBP about 3 years that worsened one month ago after a fall ascending steps. She has a flexion preference. Symptoms are usually isolated to her back, but she has tingling in her LLE at times with prolonged standing. Apart from left hip flexion, there was no significant weakness with MMT, but her 30 second chair rise was 2.5 due to reported weakness.     Functional limitations reported at initial Eval: pain/difficulty with prolonged standing, stooping, lifting, getting off the floor, and household

## 2025-06-24 ENCOUNTER — HOSPITAL ENCOUNTER (OUTPATIENT)
Dept: PHYSICAL THERAPY | Age: 60
Setting detail: RECURRING SERIES
Discharge: HOME | End: 2025-06-26
Attending: INTERNAL MEDICINE
Payer: MEDICAID

## 2025-06-24 PROCEDURE — 97110 THERAPEUTIC EXERCISES: CPT

## 2025-06-27 ENCOUNTER — HOSPITAL ENCOUNTER (OUTPATIENT)
Dept: PHYSICAL THERAPY | Age: 60
Setting detail: RECURRING SERIES
Discharge: HOME | End: 2025-06-29
Attending: INTERNAL MEDICINE
Payer: MEDICAID

## 2025-06-27 PROCEDURE — 97110 THERAPEUTIC EXERCISES: CPT

## 2025-06-27 NOTE — PROGRESS NOTES
SB rollout supine    10 reps  10 reps    gait yes      Standing forward bend on 1/2 wall 10s      Standing fwd bend with alt hip and and ext 2 x 10 B ea      SKTC  Able without sxs     1/2 kneeling on 3L foam  2xB (very difficult)     STS with table elevated above patella  2 x 5 2 x 10 reps     March with neutral pelvis  2 x 10  Seated with RTB, 2 x 10 reps    Seated Hip abd    2 x 10 reps , RTB     Alt HS curl/LAQ  2 x 10  with 2s hold GTB 2 x 10 with 2s hold GTB     HEP: ([id number]) TA sets, STS, log roll, SKTC; added standing hip abd and ext; added alt LAQ  Band colors given to pt: [] Yellow          [] Red          [x] Green          [] Blue          [] Grey      Manual Therapy: none     Patient Education:     Treatment/Session Summary:    Pt. Still feeling very locked up LLE still. Pt. Late today, and presented with heavy tightness in LLE today. Focused more on stretch and decreased LLE tightness. Will see how she feels next visit and advance appropriate.   Communication/Consultation:  None today  Equipment provided today:  None  Recommendations/Intent for next treatment session: Next visit will focus on advancements to more challenging activities. Progress repetitions, weight, and complexity of functional movement per pt tolerance and as indicated.    >Total Treatment Billable Duration: 30  minutes        Ther-Ex: (30 minutes)  Ther-Act: (0 minutes)  Manual Therapy: (0 minutes)  Neuro Re-ed: (0 minutes)  Modalities: (0 minutes)    Mikey Tatum PTA        Charge Capture  [employer] Portal  Appt Desk   Attendance Report     Future Appointments   Date Time Provider Department Center   7/1/2025 11:45 AM Shell Shi PT [facility]   7/3/2025 11:45 AM Mikey Tatum PTA [facility]   7/8/2025 11:45 AM Shell Shi PT [facility]   7/10/2025 11:00 AM Shell Shi PT [facility]   11/4/2025  9:00 AM Rafael Clifton, DO [facility]

## 2025-07-01 ENCOUNTER — HOSPITAL ENCOUNTER (OUTPATIENT)
Dept: PHYSICAL THERAPY | Age: 60
Setting detail: RECURRING SERIES
Discharge: HOME | End: 2025-07-03
Attending: INTERNAL MEDICINE
Payer: MEDICAID

## 2025-07-01 PROCEDURE — 97110 THERAPEUTIC EXERCISES: CPT

## 2025-07-01 PROCEDURE — 97530 THERAPEUTIC ACTIVITIES: CPT

## 2025-07-01 NOTE — PROGRESS NOTES
Alma Weir  : 1965  Primary: [employer] [place] (Medicaid [place])  Secondary:  [facility] @ [place] [address] [place] [facility] [address]  Phone: [phone]  Fax: [phone] Plan Frequency: 2x/wk    Plan of Care/Certification Expiration Date: 25        Plan of Care/Certification Expiration Date:  Plan of Care/Certification Expiration Date: 25    Frequency/Duration: Plan Frequency: 2x/wk      Time In/Out:   Time In: 1147  Time Out: 1231      PT Visit Info:    Plan Frequency: 2x/wk  Progress Note Counter:       Visit Count:  6    OUTPATIENT PHYSICAL THERAPY:   Treatment Note 2025       Episode  (LBP)               Treatment Diagnosis:    Difficulty in walking  Other low back pain  Medical/Referring Diagnosis:    Chronic left-sided low back pain without sciatica [M54.50, G89.29]  Primary osteoarthritis of left hip [M16.12]      Referring Physician:  Rafael Clifton DO MD Orders:  PT Eval and Treat   Return MD Appt:    Date of Onset:  Onset Date:  (about a year ago)     Allergies:   Patient has no known allergies.  Restrictions/Precautions:   Fall risk      Interventions Planned (Treatment may consist of any combination of the following):     See Assessment Note    REASON FOR TREATMENT:  LBP with a flexion preference. MRI from  showed foraminal narrowing due to disc protrusions at multiple levels with no central canal stenosis. She has recurrent LBP about 3 years that worsened one month ago after a fall ascending steps. She has a flexion preference. Symptoms are usually isolated to her back, but she has tingling in her LLE at times with prolonged standing. Apart from left hip flexion, there was no significant weakness with MMT, but her 30 second chair rise was 2.5 due to reported weakness.     Functional limitations reported at initial Eval: pain/difficulty with prolonged standing, stooping, lifting, getting off the floor, and household chores.

## 2025-07-02 ENCOUNTER — OFFICE VISIT (OUTPATIENT)
Dept: INTERNAL MEDICINE CLINIC | Facility: CLINIC | Age: 60
End: 2025-07-02
Payer: MEDICAID

## 2025-07-02 VITALS
BODY MASS INDEX: 39.37 KG/M2 | OXYGEN SATURATION: 97 % | DIASTOLIC BLOOD PRESSURE: 74 MMHG | HEART RATE: 99 BPM | WEIGHT: 222.2 LBS | HEIGHT: 63 IN | SYSTOLIC BLOOD PRESSURE: 116 MMHG | RESPIRATION RATE: 16 BRPM

## 2025-07-02 DIAGNOSIS — R29.898 WEAKNESS OF LEFT LEG: Primary | ICD-10-CM

## 2025-07-02 DIAGNOSIS — M25.552 PAIN IN JOINT OF LEFT HIP: ICD-10-CM

## 2025-07-02 DIAGNOSIS — G89.29 CHRONIC LEFT-SIDED LOW BACK PAIN WITHOUT SCIATICA: ICD-10-CM

## 2025-07-02 DIAGNOSIS — M54.50 CHRONIC LEFT-SIDED LOW BACK PAIN WITHOUT SCIATICA: ICD-10-CM

## 2025-07-02 PROCEDURE — G2211 COMPLEX E/M VISIT ADD ON: HCPCS | Performed by: INTERNAL MEDICINE

## 2025-07-02 PROCEDURE — 99214 OFFICE O/P EST MOD 30 MIN: CPT | Performed by: INTERNAL MEDICINE

## 2025-07-02 NOTE — PROGRESS NOTES
Smokeless tobacco: Never   Vaping Use    Vaping status: Never Used   Substance Use Topics    Alcohol use: Not Currently    Drug use: Not Currently     Types: Marijuana (Weed)     Vitals:    07/02/25 1315   BP: 116/74   BP Site: Left Upper Arm   Patient Position: Sitting   BP Cuff Size: Large Adult   Pulse: 99   Resp: 16   SpO2: 97%   Weight: 100.8 kg (222 lb 3.2 oz)   Height: 1.6 m (5' 3\")     Body mass index is 39.36 kg/m².  Physical Exam  Constitutional:       General: She is not in acute distress.     Appearance: She is not ill-appearing.   HENT:      Head: Normocephalic.   Cardiovascular:      Rate and Rhythm: Normal rate and regular rhythm.   Pulmonary:      Effort: Pulmonary effort is normal.   Musculoskeletal:      Cervical back: Neck supple.   Skin:     Coloration: Skin is not jaundiced.   Neurological:      Mental Status: She is alert. Mental status is at baseline.      Motor: Weakness present. No tremor or atrophy.      Coordination: Heel to Shin Test abnormal.      Gait: Tandem walk abnormal. Gait normal.   Psychiatric:         Mood and Affect: Mood normal. Mood is not anxious or depressed. Affect is not flat.         Speech: Speech normal.         Behavior: Behavior normal. Behavior is not agitated. Behavior is cooperative.         Thought Content: Thought content normal.       When asked to extend knee or lift leg cannot .  Does stand up from chair and walks without drop foot, flexes at hip  The patient (or guardian, if applicable) and other individuals in attendance with the patient were advised that Artificial Intelligence will be utilized during this visit to record, process the conversation to generate a clinical note, and support improvement of the AI technology. The patient (or guardian, if applicable) and other individuals in attendance at the appointment consented to the use of AI, including the recording.        An electronic signature was used to authenticate this note.  Rafael Clifton DO

## 2025-07-03 ENCOUNTER — HOSPITAL ENCOUNTER (OUTPATIENT)
Dept: PHYSICAL THERAPY | Age: 60
Setting detail: RECURRING SERIES
Discharge: HOME | End: 2025-07-05
Attending: INTERNAL MEDICINE
Payer: MEDICAID

## 2025-07-03 PROCEDURE — 97110 THERAPEUTIC EXERCISES: CPT

## 2025-07-03 PROCEDURE — 97530 THERAPEUTIC ACTIVITIES: CPT

## 2025-07-08 ENCOUNTER — HOSPITAL ENCOUNTER (OUTPATIENT)
Dept: PHYSICAL THERAPY | Age: 60
Setting detail: RECURRING SERIES
Discharge: HOME | End: 2025-07-10
Attending: INTERNAL MEDICINE
Payer: MEDICAID

## 2025-07-08 PROCEDURE — 97110 THERAPEUTIC EXERCISES: CPT

## 2025-07-08 PROCEDURE — 97530 THERAPEUTIC ACTIVITIES: CPT

## 2025-07-08 NOTE — THERAPY DISCHARGE
multiple recent fall and progressive weakness of her L LE. Her tolerance for PT is very limited. She has a referral to see a spine surgeon and would like to be discharged at this time. Pt may benefit from aquatic PT if they return. Pt feels they will be able to continue independently at home with a final HEP. She is being discharged this date with a final HEP.     Initial Assessment:    Patient presents with LBP with a flexion preference. MRI from 2023 showed foraminal narrowing due to disc protrusions at multiple levels with no central canal stenosis. showed foraminal stenosis at multiple levels. She has recurrent LBP about 3 years that worsened one month ago after a fall ascending steps. She has a flexion preference. Symptoms are usually isolated to her back, but she has tingling in her LLE at times with prolonged sitting. Apart from left hip flexion, there was no significant weakness with MMT, but her 30 second chair rise was 2.5 due to reported weakness. Pt will likely benefit from physical therapy to address their impairments and function limitations.    The functional deficits are as follows: pain/difficulty with prolonged standing, stooping, lifting, and household chores.     Therapy Problem List: (Impacting functional limitations):    Increased Pain, Decreased Strength, Decreased ROM, Decreased Transfer Abilities, Decreased Ambulation Ability/Technique, Decreased Functional Mobility, Decreased [place] with Home Exercise Program, Decreased Posture, Decreased Balance, Decreased Body Mechanics, Decreased Activity Tolerance/Endurance*, Increased Fatigue, and Decreased Coordination    Therapy Prognosis:   Therapy Prognosis: Good    Initial Assessment Complexity:   Decision Making: Medium Complexity    PLAN   Effective Dates: 5/29/2025 TO Plan of Care/Certification Expiration Date: 08/27/25     Frequency/Duration: Plan Frequency: 2x/wk      Interventions Planned (Treatment may consist of any combination of

## 2025-07-08 NOTE — PROGRESS NOTES
Alma Weir  : 1965  Primary: [employer] [place] (Medicaid [place])  Secondary:  [facility] @ [place] [address] [place] [address]  Phone: [phone]  Fax: [phone] Plan Frequency: 2x/wk    Plan of Care/Certification Expiration Date: 25        Plan of Care/Certification Expiration Date:  Plan of Care/Certification Expiration Date: 25    Frequency/Duration: Plan Frequency: 2x/wk      Time In/Out:   Time In: 1149  Time Out: 1230  [id number]    PT Visit Info:    Plan Frequency: 2x/wk  Progress Note Counter: 8      Visit Count:  8    OUTPATIENT PHYSICAL THERAPY:   Treatment Note 2025       Episode  (LBP)               Treatment Diagnosis:    Difficulty in walking  Other low back pain  Medical/Referring Diagnosis:    Chronic left-sided low back pain without sciatica [M54.50, G89.29]  Primary osteoarthritis of left hip [M16.12]      Referring Physician:  Rafael Clifton DO MD Orders:  PT Eval and Treat   Return MD Appt:    Date of Onset:  Onset Date:  (about a year ago)     Allergies:   Patient has no known allergies.  Restrictions/Precautions:   Fall risk      Interventions Planned (Treatment may consist of any combination of the following):     See Assessment Note    REASON FOR TREATMENT: LBP with a flexion preference. MRI from  showed foraminal narrowing due to disc protrusions at multiple levels with no central canal stenosis. She has recurrent LBP about 3 years that worsened one month ago after a fall ascending steps. She has a flexion preference. Symptoms are usually isolated to her back, but she has tingling in her LLE at times with prolonged standing. Apart from left hip flexion, there was no significant weakness with MMT, but her 30 second chair rise was 2.5 due to reported weakness.     Functional limitations reported at initial Eval: pain/difficulty with prolonged standing, stooping, lifting, getting off the floor, and household

## 2025-07-10 ENCOUNTER — HOSPITAL ENCOUNTER (OUTPATIENT)
Dept: PHYSICAL THERAPY | Age: 60
Setting detail: RECURRING SERIES
End: 2025-07-10
Attending: INTERNAL MEDICINE
Payer: MEDICAID

## 2025-07-19 ENCOUNTER — HOSPITAL ENCOUNTER (OUTPATIENT)
Dept: MRI IMAGING | Age: 60
Discharge: HOME | End: 2025-07-21
Attending: INTERNAL MEDICINE
Payer: MEDICAID

## 2025-07-19 DIAGNOSIS — G89.29 CHRONIC LEFT-SIDED LOW BACK PAIN WITHOUT SCIATICA: ICD-10-CM

## 2025-07-19 DIAGNOSIS — R29.898 WEAKNESS OF LEFT LEG: ICD-10-CM

## 2025-07-19 DIAGNOSIS — M54.50 CHRONIC LEFT-SIDED LOW BACK PAIN WITHOUT SCIATICA: ICD-10-CM

## 2025-07-19 PROCEDURE — 72148 MRI LUMBAR SPINE W/O DYE: CPT

## (undated) DEVICE — HANDPIECE SET WITH COAXIAL HIGH FLOW TIP AND SUCTION TUBE: Brand: INTERPULSE

## (undated) DEVICE — CLOSURE SKIN FLX NONINVASIVE PRELOC TECHNOLOGY FOR 24IN

## (undated) DEVICE — SUTURE VCRL SZ 1 L36IN ABSRB UD CTX L48MM 1/2 CIR J977H

## (undated) DEVICE — POWDER SURG CELLERATE RX 1 GM HYDROL COLLEGEN

## (undated) DEVICE — SOLUTION IV 250ML 0.9% SOD CHL CLR INJ FLX BG CONT PRT CLSR

## (undated) DEVICE — YANKAUER,BULB TIP,W/O VENT,RIGID,STERILE: Brand: MEDLINE

## (undated) DEVICE — REM POLYHESIVE ADULT PATIENT RETURN ELECTRODE: Brand: VALLEYLAB

## (undated) DEVICE — PREP SKN CHLRAPRP APL 26ML STR --

## (undated) DEVICE — TOTAL HIP DR RIDGEWAY: Brand: MEDLINE INDUSTRIES, INC.

## (undated) DEVICE — SUTURE STRATAFIX SPRL SZ 1 L14IN ABSRB VLT L48CM CTX 1/2 SXPD2B405

## (undated) DEVICE — NEEDLE SPNL 22GA L3.5IN BLK HUB S STL REG WALL FIT STYL W/

## (undated) DEVICE — SUTURE ABS ANTIBACT 1-0 CTX 24IN STRATAFIX PDS+ SXPP1A445

## (undated) DEVICE — SYR LR LCK 1ML GRAD NSAF 30ML --

## (undated) DEVICE — BNDG COHESIVE 4INX5YD COBAN --

## (undated) DEVICE — SUTURE STRATAFIX SYMMETRIC PDS + SZ 1 L18IN ABSRB VLT L48MM SXPP1A400

## (undated) DEVICE — SUTURE VCRL SZ 1 L27IN ABSRB UD L36MM CP-1 1/2 CIR REV CUT J268H

## (undated) DEVICE — 3M™ IOBAN™ 2 ANTIMICROBIAL INCISE DRAPE 6651EZ: Brand: IOBAN™ 2

## (undated) DEVICE — SOLUTION IV 1000ML 0.9% SOD CHL

## (undated) DEVICE — CLOSURE SKIN FACILITATES COMPATIBILITY W/ CERTAIN IS DSG

## (undated) DEVICE — 3M™ STERI-DRAPE™ INSTRUMENT POUCH 1018: Brand: STERI-DRAPE™

## (undated) DEVICE — SYR 50ML LR LCK 1ML GRAD NSAF --

## (undated) DEVICE — SOLUTION IRRIG 3000ML 0.9% SOD CHL FLX CONT 0797208] ICU MEDICAL INC]

## (undated) DEVICE — ZIP 16 SURGICAL SKIN CLOSURE DEVICE: Brand: ZIP 16 SURGICAL SKIN CLOSURE DEVICE

## (undated) DEVICE — TRAY PREP DRY W/ PREM GLV 2 APPL 6 SPNG 2 UNDPD 1 OVERWRAP

## (undated) DEVICE — STRYKER PERFORMANCE SERIES SAGITTAL BLADE: Brand: STRYKER PERFORMANCE SERIES

## (undated) DEVICE — SUTURE VCRL SZ 2-0 L27IN ABSRB UD L36MM CP-1 1/2 CIR REV J266H

## (undated) DEVICE — SUTURE ETHBND EXCEL SZ 5 L30IN NONABSORBABLE GRN L40MM V-37 MB66G

## (undated) DEVICE — DRSG AQUACEL SURG 3.5 X 10IN -- CONVERT TO ITEM 370183

## (undated) DEVICE — SUTURE VCRL + SZ 2-0 L27IN ABSRB UD CP-1 1/2 CIR REV CUT VCP266H

## (undated) DEVICE — SYRINGE CATH TIP 50ML

## (undated) DEVICE — SUTURE PDS II SZ 1 L96IN ABSRB VLT TP-1 L65MM 1/2 CIR Z880G

## (undated) DEVICE — NEEDLE,18GX1.5",REG,BEVEL: Brand: MEDLINE

## (undated) DEVICE — T4 HOOD